# Patient Record
Sex: MALE | Race: WHITE | Employment: FULL TIME | URBAN - METROPOLITAN AREA
[De-identification: names, ages, dates, MRNs, and addresses within clinical notes are randomized per-mention and may not be internally consistent; named-entity substitution may affect disease eponyms.]

---

## 2017-01-13 ENCOUNTER — LAB ENCOUNTER (OUTPATIENT)
Dept: LAB | Age: 52
End: 2017-01-13
Attending: FAMILY MEDICINE
Payer: COMMERCIAL

## 2017-01-13 ENCOUNTER — HOSPITAL ENCOUNTER (OUTPATIENT)
Dept: GENERAL RADIOLOGY | Age: 52
Discharge: HOME | End: 2017-01-13
Attending: FAMILY MEDICINE
Payer: COMMERCIAL

## 2017-01-13 ENCOUNTER — OFFICE VISIT (OUTPATIENT)
Dept: FAMILY MEDICINE CLINIC | Facility: CLINIC | Age: 52
End: 2017-01-13

## 2017-01-13 VITALS
TEMPERATURE: 98 F | RESPIRATION RATE: 20 BRPM | HEIGHT: 68 IN | BODY MASS INDEX: 27.89 KG/M2 | HEART RATE: 66 BPM | WEIGHT: 184 LBS | SYSTOLIC BLOOD PRESSURE: 132 MMHG | DIASTOLIC BLOOD PRESSURE: 78 MMHG

## 2017-01-13 DIAGNOSIS — Z00.00 ADULT GENERAL MEDICAL EXAM: Primary | ICD-10-CM

## 2017-01-13 DIAGNOSIS — R09.82 PND (POST-NASAL DRIP): ICD-10-CM

## 2017-01-13 DIAGNOSIS — D23.30 FIBROUS PAPULE OF FACE: ICD-10-CM

## 2017-01-13 DIAGNOSIS — M79.642 PAIN IN BOTH HANDS: ICD-10-CM

## 2017-01-13 DIAGNOSIS — E78.2 MIXED HYPERLIPIDEMIA: ICD-10-CM

## 2017-01-13 DIAGNOSIS — K21.9 GASTROESOPHAGEAL REFLUX DISEASE, ESOPHAGITIS PRESENCE NOT SPECIFIED: ICD-10-CM

## 2017-01-13 DIAGNOSIS — M79.641 PAIN IN BOTH HANDS: ICD-10-CM

## 2017-01-13 DIAGNOSIS — Z00.00 ADULT GENERAL MEDICAL EXAM: ICD-10-CM

## 2017-01-13 DIAGNOSIS — R06.00 DYSPNEA, UNSPECIFIED TYPE: ICD-10-CM

## 2017-01-13 LAB
ALBUMIN SERPL BCP-MCNC: 4.2 G/DL (ref 3.5–4.8)
ALBUMIN/GLOB SERPL: 1.2 {RATIO} (ref 1–2)
ALP SERPL-CCNC: 58 U/L (ref 32–100)
ALT SERPL-CCNC: 36 U/L (ref 17–63)
ANION GAP SERPL CALC-SCNC: 8 MMOL/L (ref 0–18)
AST SERPL-CCNC: 28 U/L (ref 15–41)
BASOPHILS # BLD: 0 K/UL (ref 0–0.2)
BASOPHILS NFR BLD: 1 %
BILIRUB SERPL-MCNC: 1.1 MG/DL (ref 0.3–1.2)
BUN SERPL-MCNC: 17 MG/DL (ref 8–20)
BUN/CREAT SERPL: 17.3 (ref 10–20)
CALCIUM SERPL-MCNC: 9.2 MG/DL (ref 8.5–10.5)
CHLORIDE SERPL-SCNC: 106 MMOL/L (ref 95–110)
CHOLEST SERPL-MCNC: 159 MG/DL (ref 110–200)
CO2 SERPL-SCNC: 26 MMOL/L (ref 22–32)
CREAT SERPL-MCNC: 0.98 MG/DL (ref 0.5–1.5)
EOSINOPHIL # BLD: 0.1 K/UL (ref 0–0.7)
EOSINOPHIL NFR BLD: 3 %
ERYTHROCYTE [DISTWIDTH] IN BLOOD BY AUTOMATED COUNT: 13.6 % (ref 11–15)
GLOBULIN PLAS-MCNC: 3.5 G/DL (ref 2.5–3.7)
GLUCOSE SERPL-MCNC: 106 MG/DL (ref 70–99)
HCT VFR BLD AUTO: 47.9 % (ref 41–52)
HDLC SERPL-MCNC: 37 MG/DL
HGB BLD-MCNC: 16.3 G/DL (ref 13.5–17.5)
LDLC SERPL CALC-MCNC: 92 MG/DL (ref 0–99)
LYMPHOCYTES # BLD: 1.1 K/UL (ref 1–4)
LYMPHOCYTES NFR BLD: 24 %
MCH RBC QN AUTO: 29.3 PG (ref 27–32)
MCHC RBC AUTO-ENTMCNC: 34 G/DL (ref 32–37)
MCV RBC AUTO: 86.3 FL (ref 80–100)
MONOCYTES # BLD: 0.3 K/UL (ref 0–1)
MONOCYTES NFR BLD: 7 %
NEUTROPHILS # BLD AUTO: 3 K/UL (ref 1.8–7.7)
NEUTROPHILS NFR BLD: 65 %
NONHDLC SERPL-MCNC: 122 MG/DL
OSMOLALITY UR CALC.SUM OF ELEC: 292 MOSM/KG (ref 275–295)
PLATELET # BLD AUTO: 166 K/UL (ref 140–400)
PMV BLD AUTO: 8 FL (ref 7.4–10.3)
POTASSIUM SERPL-SCNC: 4 MMOL/L (ref 3.3–5.1)
PROT SERPL-MCNC: 7.7 G/DL (ref 5.9–8.4)
PSA SERPL-MCNC: 0.5 NG/ML (ref 0–4)
RBC # BLD AUTO: 5.55 M/UL (ref 4.5–5.9)
SODIUM SERPL-SCNC: 140 MMOL/L (ref 136–144)
TRIGL SERPL-MCNC: 152 MG/DL (ref 1–149)
TSH SERPL-ACNC: 1.8 UIU/ML (ref 0.34–5.6)
WBC # BLD AUTO: 4.5 K/UL (ref 4–11)

## 2017-01-13 PROCEDURE — 84443 ASSAY THYROID STIM HORMONE: CPT

## 2017-01-13 PROCEDURE — 80053 COMPREHEN METABOLIC PANEL: CPT

## 2017-01-13 PROCEDURE — 85025 COMPLETE CBC W/AUTO DIFF WBC: CPT

## 2017-01-13 PROCEDURE — 99396 PREV VISIT EST AGE 40-64: CPT | Performed by: FAMILY MEDICINE

## 2017-01-13 PROCEDURE — 80061 LIPID PANEL: CPT

## 2017-01-13 PROCEDURE — 99213 OFFICE O/P EST LOW 20 MIN: CPT | Performed by: FAMILY MEDICINE

## 2017-01-13 PROCEDURE — 36415 COLL VENOUS BLD VENIPUNCTURE: CPT

## 2017-01-13 PROCEDURE — 71020 XR CHEST PA + LAT CHEST (CPT=71020): CPT

## 2017-01-13 RX ORDER — PANTOPRAZOLE SODIUM 40 MG/1
TABLET, DELAYED RELEASE ORAL
Qty: 30 TABLET | Refills: 11 | Status: SHIPPED | OUTPATIENT
Start: 2017-01-13 | End: 2017-08-10

## 2017-01-13 RX ORDER — MONTELUKAST SODIUM 10 MG/1
10 TABLET ORAL DAILY
Qty: 30 TABLET | Refills: 3 | Status: SHIPPED | OUTPATIENT
Start: 2017-01-13 | End: 2017-07-12

## 2017-01-13 RX ORDER — FLUTICASONE PROPIONATE 50 MCG
2 SPRAY, SUSPENSION (ML) NASAL DAILY
Qty: 1 BOTTLE | Refills: 3 | Status: SHIPPED | OUTPATIENT
Start: 2017-01-13 | End: 2017-05-13

## 2017-01-13 NOTE — PROGRESS NOTES
Patient ID: Milena Gonzalez is a 45year old male. HPI  Patient presents with:  Routine Physical: annual    He states he always seems to have to clear his throat. He feels something is hanging from his throat in the back.   He also states he feels he c Nabumetone 750 MG Oral Tab Take 1 tablet (750 mg total) by mouth 2 (two) times daily. With meals. (for pain/inflammation).  Disp: 60 tablet Rfl: 1     Allergies:No Known Allergies   PHYSICAL EXAM:   Physical Exam   Constitutional: He is oriented to person, Psychiatric: He has a normal mood and affect. Vitals reviewed. Blood pressure 146/87, pulse 66, temperature 98.2 °F (36.8 °C), temperature source Oral, resp. rate 20, height 5' 8\" (1.727 m), weight 184 lb (83.462 kg).    01/13/17  1055 01/13/17  1123 Follow up if symptoms persist.  Take medicine (if given) as prescribed. Approach to treatment discussed and patient/family member understands and agrees to plan.          Aditya Brantley,   1/13/2017

## 2017-04-06 RX ORDER — ATORVASTATIN CALCIUM 20 MG/1
20 TABLET, FILM COATED ORAL NIGHTLY
Qty: 90 TABLET | Refills: 2 | Status: SHIPPED | OUTPATIENT
Start: 2017-04-06 | End: 2018-02-18

## 2017-04-06 NOTE — TELEPHONE ENCOUNTER
From: Angelina Pride  To:  Sukhi Arroyo DO  Sent: 4/3/2017 10:22 PM CDT  Subject: Medication Renewal Request    Original authorizing provider: DO Angelina Arreola would like a refill of the following medications:  Atorvastatin Calcium (

## 2017-04-06 NOTE — TELEPHONE ENCOUNTER
Passed protocol. Medication refilled.       Cholesterol Medications  Protocol Criteria:  · Appointment scheduled in the past 12 months or in the next 3 months  · ALT & LDL on file in the past 12 months  · ALT result < 80  · LDL result <130   Recent Visits

## 2017-08-10 ENCOUNTER — HOSPITAL ENCOUNTER (OUTPATIENT)
Dept: GENERAL RADIOLOGY | Age: 52
Discharge: HOME | End: 2017-08-10
Attending: FAMILY MEDICINE
Payer: COMMERCIAL

## 2017-08-10 ENCOUNTER — OFFICE VISIT (OUTPATIENT)
Dept: FAMILY MEDICINE CLINIC | Facility: CLINIC | Age: 52
End: 2017-08-10

## 2017-08-10 ENCOUNTER — APPOINTMENT (OUTPATIENT)
Dept: LAB | Age: 52
End: 2017-08-10
Attending: FAMILY MEDICINE
Payer: COMMERCIAL

## 2017-08-10 VITALS
DIASTOLIC BLOOD PRESSURE: 82 MMHG | SYSTOLIC BLOOD PRESSURE: 132 MMHG | HEART RATE: 68 BPM | RESPIRATION RATE: 12 BRPM | HEIGHT: 68 IN | BODY MASS INDEX: 28.25 KG/M2 | WEIGHT: 186.38 LBS | TEMPERATURE: 98 F

## 2017-08-10 DIAGNOSIS — M25.461 EFFUSION, RIGHT KNEE: ICD-10-CM

## 2017-08-10 DIAGNOSIS — G89.29 CHRONIC PAIN OF RIGHT KNEE: ICD-10-CM

## 2017-08-10 DIAGNOSIS — R73.9 HYPERGLYCEMIA: ICD-10-CM

## 2017-08-10 DIAGNOSIS — M25.561 CHRONIC PAIN OF RIGHT KNEE: ICD-10-CM

## 2017-08-10 DIAGNOSIS — E78.2 MIXED HYPERLIPIDEMIA: ICD-10-CM

## 2017-08-10 DIAGNOSIS — E78.2 MIXED HYPERLIPIDEMIA: Primary | ICD-10-CM

## 2017-08-10 DIAGNOSIS — K21.9 GASTROESOPHAGEAL REFLUX DISEASE, ESOPHAGITIS PRESENCE NOT SPECIFIED: ICD-10-CM

## 2017-08-10 LAB
ALT SERPL-CCNC: 31 U/L (ref 17–63)
AST SERPL-CCNC: 26 U/L (ref 15–41)
CHOLEST SERPL-MCNC: 152 MG/DL (ref 110–200)
GLUCOSE SERPL-MCNC: 100 MG/DL (ref 70–99)
HDLC SERPL-MCNC: 38 MG/DL
LDLC SERPL CALC-MCNC: 92 MG/DL (ref 0–99)
NONHDLC SERPL-MCNC: 114 MG/DL
TRIGL SERPL-MCNC: 111 MG/DL (ref 1–149)

## 2017-08-10 PROCEDURE — 84450 TRANSFERASE (AST) (SGOT): CPT

## 2017-08-10 PROCEDURE — 99212 OFFICE O/P EST SF 10 MIN: CPT | Performed by: FAMILY MEDICINE

## 2017-08-10 PROCEDURE — 73564 X-RAY EXAM KNEE 4 OR MORE: CPT | Performed by: FAMILY MEDICINE

## 2017-08-10 PROCEDURE — 99214 OFFICE O/P EST MOD 30 MIN: CPT | Performed by: FAMILY MEDICINE

## 2017-08-10 PROCEDURE — 36415 COLL VENOUS BLD VENIPUNCTURE: CPT

## 2017-08-10 PROCEDURE — 84460 ALANINE AMINO (ALT) (SGPT): CPT

## 2017-08-10 PROCEDURE — 80061 LIPID PANEL: CPT

## 2017-08-10 PROCEDURE — 82947 ASSAY GLUCOSE BLOOD QUANT: CPT

## 2017-08-10 RX ORDER — PANTOPRAZOLE SODIUM 40 MG/1
TABLET, DELAYED RELEASE ORAL
Qty: 30 TABLET | Refills: 11 | Status: SHIPPED | OUTPATIENT
Start: 2017-08-10 | End: 2018-02-18

## 2017-08-10 RX ORDER — MELOXICAM 15 MG/1
15 TABLET ORAL DAILY
Qty: 30 TABLET | Refills: 1 | Status: SHIPPED | OUTPATIENT
Start: 2017-08-10 | End: 2017-09-09

## 2017-08-10 NOTE — PROGRESS NOTES
Patient ID: Tung Mathis is a 47year old male. HPI  Patient presents with:  Hyperlipidemia  Pain: right knee    He has been taking his atorvastatin daily. He states he feels well with it. He has no chest pain or shortness of breath.   He works as ANIONGAP 8 01/13/2017   GFRAA >60 01/13/2017   GFRNAA >60 01/13/2017   CA 9.2 01/13/2017    01/13/2017   K 4.0 01/13/2017    01/13/2017   CO2 26 01/13/2017   OSMOCALC 292 01/13/2017         Lab Results  Component Value Date   COLORUR Yellow 1 : 132/78  07/28/16 : 132/81  03/31/16 : 135/78  11/20/14 : 127/83  09/04/14 : 129/78        Review of Systems      Past Medical History:   Diagnosis Date   • Hypercholesterolemia        Past Surgical History:  10/10/13: ELECTROCARDIOGRAM, COMPLETE      Com Negative      Pivot Shift Negative              Varus Stress       0 Degrees Negative      30 Degrees Negative       Valgus Stress        0 Degrees Negative      30 Degrees Negative       Patellar apprehension Negative   Patellofemoral pain Positive

## 2018-02-18 DIAGNOSIS — K21.9 GASTROESOPHAGEAL REFLUX DISEASE, ESOPHAGITIS PRESENCE NOT SPECIFIED: ICD-10-CM

## 2018-02-19 RX ORDER — ATORVASTATIN CALCIUM 20 MG/1
20 TABLET, FILM COATED ORAL NIGHTLY
Qty: 90 TABLET | Refills: 0 | Status: SHIPPED
Start: 2018-02-19 | End: 2018-03-02

## 2018-02-19 RX ORDER — PANTOPRAZOLE SODIUM 40 MG/1
TABLET, DELAYED RELEASE ORAL
Qty: 90 TABLET | Refills: 0 | Status: SHIPPED
Start: 2018-02-19 | End: 2018-03-02

## 2018-02-19 NOTE — TELEPHONE ENCOUNTER
From: Charlene Tabares  Sent: 2/18/2018 6:37 AM CST  Subject: Medication Renewal Request    Charlene Tabares would like a refill of the following medications:     Atorvastatin Calcium (LIPITOR) 20 MG Oral Tab Inna Youssef DO]   Patient Comment: [facility]

## 2018-02-19 NOTE — TELEPHONE ENCOUNTER
Cholesterol Medications; REFILLED PER PROTOCOL.     Protocol Criteria:  · Appointment scheduled in the past 12 months or in the next 3 months  · ALT & LDL on file in the past 12 months  · ALT result < 80  · LDL result <130   Recent Outpatient Visits Jessi Roth, DO [address], [facility] [address], [place] physical

## 2018-03-02 ENCOUNTER — OFFICE VISIT (OUTPATIENT)
Dept: FAMILY MEDICINE CLINIC | Facility: CLINIC | Age: 53
End: 2018-03-02

## 2018-03-02 ENCOUNTER — LAB ENCOUNTER (OUTPATIENT)
Dept: LAB | Age: 53
End: 2018-03-02
Attending: FAMILY MEDICINE
Payer: COMMERCIAL

## 2018-03-02 VITALS
HEIGHT: 68 IN | TEMPERATURE: 98 F | WEIGHT: 190 LBS | HEART RATE: 71 BPM | BODY MASS INDEX: 28.79 KG/M2 | SYSTOLIC BLOOD PRESSURE: 123 MMHG | DIASTOLIC BLOOD PRESSURE: 75 MMHG

## 2018-03-02 DIAGNOSIS — E78.2 MIXED HYPERLIPIDEMIA: ICD-10-CM

## 2018-03-02 DIAGNOSIS — Z00.00 ADULT GENERAL MEDICAL EXAM: Primary | ICD-10-CM

## 2018-03-02 DIAGNOSIS — S39.011A STRAIN OF ABDOMINAL MUSCLE, INITIAL ENCOUNTER: ICD-10-CM

## 2018-03-02 DIAGNOSIS — Z00.00 ADULT GENERAL MEDICAL EXAM: ICD-10-CM

## 2018-03-02 DIAGNOSIS — K21.9 GASTROESOPHAGEAL REFLUX DISEASE, ESOPHAGITIS PRESENCE NOT SPECIFIED: ICD-10-CM

## 2018-03-02 DIAGNOSIS — M25.60 MULTIPLE STIFF JOINTS: ICD-10-CM

## 2018-03-02 DIAGNOSIS — M25.50 ARTHRALGIA, UNSPECIFIED JOINT: ICD-10-CM

## 2018-03-02 LAB
ALBUMIN SERPL BCP-MCNC: 4.1 G/DL (ref 3.5–4.8)
ALBUMIN/GLOB SERPL: 1.3 {RATIO} (ref 1–2)
ALP SERPL-CCNC: 50 U/L (ref 32–100)
ALT SERPL-CCNC: 32 U/L (ref 17–63)
ANION GAP SERPL CALC-SCNC: 10 MMOL/L (ref 0–18)
AST SERPL-CCNC: 26 U/L (ref 15–41)
BASOPHILS # BLD: 0.1 K/UL (ref 0–0.2)
BASOPHILS NFR BLD: 1 %
BILIRUB SERPL-MCNC: 1.8 MG/DL (ref 0.3–1.2)
BUN SERPL-MCNC: 13 MG/DL (ref 8–20)
BUN/CREAT SERPL: 12.9 (ref 10–20)
CALCIUM SERPL-MCNC: 9.1 MG/DL (ref 8.5–10.5)
CHLORIDE SERPL-SCNC: 101 MMOL/L (ref 95–110)
CHOLEST SERPL-MCNC: 138 MG/DL (ref 110–200)
CO2 SERPL-SCNC: 25 MMOL/L (ref 22–32)
CREAT SERPL-MCNC: 1.01 MG/DL (ref 0.5–1.5)
CRP SERPL-MCNC: <0.5 MG/DL (ref 0–0.9)
EOSINOPHIL # BLD: 0.2 K/UL (ref 0–0.7)
EOSINOPHIL NFR BLD: 4 %
ERYTHROCYTE [DISTWIDTH] IN BLOOD BY AUTOMATED COUNT: 13.4 % (ref 11–15)
ERYTHROCYTE [SEDIMENTATION RATE] IN BLOOD: 10 MM/HR (ref 0–20)
GLOBULIN PLAS-MCNC: 3.1 G/DL (ref 2.5–3.7)
GLUCOSE SERPL-MCNC: 105 MG/DL (ref 70–99)
HCT VFR BLD AUTO: 47.3 % (ref 41–52)
HDLC SERPL-MCNC: 35 MG/DL
HGB BLD-MCNC: 16 G/DL (ref 13.5–17.5)
LDLC SERPL CALC-MCNC: 74 MG/DL (ref 0–99)
LYMPHOCYTES # BLD: 1 K/UL (ref 1–4)
LYMPHOCYTES NFR BLD: 21 %
MCH RBC QN AUTO: 28.8 PG (ref 27–32)
MCHC RBC AUTO-ENTMCNC: 33.8 G/DL (ref 32–37)
MCV RBC AUTO: 85.3 FL (ref 80–100)
MONOCYTES # BLD: 0.4 K/UL (ref 0–1)
MONOCYTES NFR BLD: 8 %
NEUTROPHILS # BLD AUTO: 3.2 K/UL (ref 1.8–7.7)
NEUTROPHILS NFR BLD: 66 %
NONHDLC SERPL-MCNC: 103 MG/DL
OSMOLALITY UR CALC.SUM OF ELEC: 282 MOSM/KG (ref 275–295)
PATIENT FASTING: YES
PLATELET # BLD AUTO: 165 K/UL (ref 140–400)
PMV BLD AUTO: 7.8 FL (ref 7.4–10.3)
POTASSIUM SERPL-SCNC: 3.9 MMOL/L (ref 3.3–5.1)
PROT SERPL-MCNC: 7.2 G/DL (ref 5.9–8.4)
PSA SERPL-MCNC: 0.5 NG/ML (ref 0–4)
RBC # BLD AUTO: 5.54 M/UL (ref 4.5–5.9)
RHEUMATOID FACT SER QL: <5 IU/ML
SODIUM SERPL-SCNC: 136 MMOL/L (ref 136–144)
TRIGL SERPL-MCNC: 147 MG/DL (ref 1–149)
TSH SERPL-ACNC: 2.75 UIU/ML (ref 0.45–5.33)
WBC # BLD AUTO: 4.9 K/UL (ref 4–11)

## 2018-03-02 PROCEDURE — 86431 RHEUMATOID FACTOR QUANT: CPT

## 2018-03-02 PROCEDURE — 85652 RBC SED RATE AUTOMATED: CPT

## 2018-03-02 PROCEDURE — 80061 LIPID PANEL: CPT

## 2018-03-02 PROCEDURE — 86140 C-REACTIVE PROTEIN: CPT

## 2018-03-02 PROCEDURE — 80053 COMPREHEN METABOLIC PANEL: CPT

## 2018-03-02 PROCEDURE — 36415 COLL VENOUS BLD VENIPUNCTURE: CPT

## 2018-03-02 PROCEDURE — 82306 VITAMIN D 25 HYDROXY: CPT

## 2018-03-02 PROCEDURE — 85025 COMPLETE CBC W/AUTO DIFF WBC: CPT

## 2018-03-02 PROCEDURE — 84443 ASSAY THYROID STIM HORMONE: CPT

## 2018-03-02 PROCEDURE — 99396 PREV VISIT EST AGE 40-64: CPT | Performed by: FAMILY MEDICINE

## 2018-03-02 PROCEDURE — 99212 OFFICE O/P EST SF 10 MIN: CPT | Performed by: FAMILY MEDICINE

## 2018-03-02 PROCEDURE — 86038 ANTINUCLEAR ANTIBODIES: CPT

## 2018-03-02 PROCEDURE — 99213 OFFICE O/P EST LOW 20 MIN: CPT | Performed by: FAMILY MEDICINE

## 2018-03-02 RX ORDER — ATORVASTATIN CALCIUM 20 MG/1
20 TABLET, FILM COATED ORAL NIGHTLY
Qty: 90 TABLET | Refills: 3 | Status: SHIPPED | OUTPATIENT
Start: 2018-03-02 | End: 2018-09-23

## 2018-03-02 RX ORDER — PANTOPRAZOLE SODIUM 40 MG/1
TABLET, DELAYED RELEASE ORAL
Qty: 90 TABLET | Refills: 3 | Status: SHIPPED | OUTPATIENT
Start: 2018-03-02 | End: 2018-08-07

## 2018-03-02 NOTE — PROGRESS NOTES
Patient ID: Gibran Das is a 47year old male. HPI  Patient presents with:  Physical    He is here for physical.  His colonoscopy is up-to-date. He does not urinate at night. No tobacco now for 1 year. He works at the hospital in maintenance. 292 01/13/2017   ALKPHO 58 01/13/2017   AST 26 08/10/2017   ALT 31 08/10/2017   ALKPHOS 59 04/02/2016   BILT 1.1 01/13/2017   TP 7.7 01/13/2017   ALB 4.2 01/13/2017   GLOBULIN 3.5 01/13/2017   AGRATIO 1.0 04/02/2016    01/13/2017   K 4.0 01/13/2017 [facility]    Lab Results  Component Value Date   PSA 0.5 01/13/2017   TOTPSASCREEN 0.5 04/02/2016         Wt Readings from Last 6 Encounters:  03/02/18 : 190 lb (86.2 kg)  08/10/17 : 186 lb 6.4 oz (84.6 kg)  01/13/17 : 184 lb (83.5 kg)  07/28/16 : 184 lb (8 file     Other Topics Concern    Caffeine Concern Yes    Comment: 4 cups coffee/day     Social History Narrative   None on file              Current Outpatient Prescriptions:  atorvastatin (LIPITOR) 20 MG Oral Tab Take 1 tablet (20 mg total) by mouth night weight 190 lb (86.2 kg). ASSESSMENT/PLAN:     Diagnoses and all orders for this visit:    Adult general medical exam  -     CBC WITH DIFFERENTIAL WITH PLATELET; Future  -     COMP METABOLIC PANEL (14); Future  -     LIPID PANEL;  Future  -     PSA S

## 2018-03-05 LAB
25(OH)D3 SERPL-MCNC: 17.6 NG/ML
NUCLEAR IGG TITR SER IF: NEGATIVE {TITER}

## 2018-03-10 ENCOUNTER — PATIENT MESSAGE (OUTPATIENT)
Dept: FAMILY MEDICINE CLINIC | Facility: CLINIC | Age: 53
End: 2018-03-10

## 2018-03-12 RX ORDER — ERGOCALCIFEROL 1.25 MG/1
50000 CAPSULE ORAL WEEKLY
Qty: 12 CAPSULE | Refills: 0 | Status: SHIPPED | OUTPATIENT
Start: 2018-03-12 | End: 2019-04-11

## 2018-03-12 NOTE — TELEPHONE ENCOUNTER
From: Ambar Smith  To:  Martha Lacy DO  Sent: 3/10/2018 6:17 PM CST  Subject: Prescription Question    In Dr. Rubalcava Mode comments about my last test results, he said that he was going to put me on vitamin D for the next 3 months (because I had gone lo

## 2018-05-25 RX ORDER — ATORVASTATIN CALCIUM 20 MG/1
20 TABLET, FILM COATED ORAL NIGHTLY
Qty: 90 TABLET | Refills: 3 | Status: CANCELLED | OUTPATIENT
Start: 2018-05-25

## 2018-05-25 NOTE — TELEPHONE ENCOUNTER
From: Annie Pereira  Sent: 5/25/2018 12:06 PM CDT  Subject: Medication Renewal Request    Annie Pereira would like a refill of the following medications:     atorvastatin (LIPITOR) 20 MG Oral Tab Fay Nguyen, ]   Patient Comment: I almost run out

## 2018-05-29 NOTE — TELEPHONE ENCOUNTER
Disp Refills Start End    atorvastatin (LIPITOR) 20 MG Oral Tab 90 tablet 3 3/2/2018     Sig - Route: Take 1 tablet (20 mg total) by mouth nightly.  - Oral    E-Prescribing Status: Receipt confirmed by pharmacy (3/2/2018  8:33 AM CST      Patient notified

## 2018-08-07 DIAGNOSIS — K21.9 GASTROESOPHAGEAL REFLUX DISEASE, ESOPHAGITIS PRESENCE NOT SPECIFIED: ICD-10-CM

## 2018-08-08 RX ORDER — PANTOPRAZOLE SODIUM 40 MG/1
TABLET, DELAYED RELEASE ORAL
Qty: 90 TABLET | Refills: 0 | Status: SHIPPED | OUTPATIENT
Start: 2018-08-08 | End: 2018-09-13

## 2018-08-08 NOTE — TELEPHONE ENCOUNTER
Refill Protocol Appointment Criteria  · Appointment scheduled in the past 12 months or in the next 3 months  Recent Outpatient Visits            5 months ago Adult general medical exam    [address], [place],     Office V

## 2018-08-08 NOTE — TELEPHONE ENCOUNTER
From: Reggie Newman  Sent: 8/7/2018 10:01 PM CDT  Subject: Medication Renewal Request    Reggie Newman would like a refill of the following medications:     Pantoprazole Sodium 40 MG Oral Tab EC Tramaine Gagnon DO]    [facility]

## 2018-09-13 ENCOUNTER — HOSPITAL ENCOUNTER (OUTPATIENT)
Dept: GENERAL RADIOLOGY | Age: 53
Discharge: HOME | End: 2018-09-13
Attending: FAMILY MEDICINE
Payer: COMMERCIAL

## 2018-09-13 ENCOUNTER — APPOINTMENT (OUTPATIENT)
Dept: LAB | Age: 53
End: 2018-09-13
Attending: FAMILY MEDICINE
Payer: COMMERCIAL

## 2018-09-13 ENCOUNTER — OFFICE VISIT (OUTPATIENT)
Dept: FAMILY MEDICINE CLINIC | Facility: CLINIC | Age: 53
End: 2018-09-13
Payer: COMMERCIAL

## 2018-09-13 VITALS
HEART RATE: 85 BPM | SYSTOLIC BLOOD PRESSURE: 139 MMHG | HEIGHT: 68 IN | BODY MASS INDEX: 28.04 KG/M2 | WEIGHT: 185 LBS | DIASTOLIC BLOOD PRESSURE: 83 MMHG | TEMPERATURE: 98 F

## 2018-09-13 DIAGNOSIS — M79.641 PAIN IN BOTH HANDS: ICD-10-CM

## 2018-09-13 DIAGNOSIS — M25.50 ARTHRALGIA, UNSPECIFIED JOINT: ICD-10-CM

## 2018-09-13 DIAGNOSIS — M25.60 MULTIPLE STIFF JOINTS: ICD-10-CM

## 2018-09-13 DIAGNOSIS — K21.9 GASTROESOPHAGEAL REFLUX DISEASE, ESOPHAGITIS PRESENCE NOT SPECIFIED: ICD-10-CM

## 2018-09-13 DIAGNOSIS — M79.642 PAIN IN BOTH HANDS: ICD-10-CM

## 2018-09-13 DIAGNOSIS — E55.9 VITAMIN D DEFICIENCY: ICD-10-CM

## 2018-09-13 DIAGNOSIS — E78.2 MIXED HYPERLIPIDEMIA: ICD-10-CM

## 2018-09-13 DIAGNOSIS — E78.2 MIXED HYPERLIPIDEMIA: Primary | ICD-10-CM

## 2018-09-13 LAB
ALT SERPL-CCNC: 31 U/L (ref 17–63)
ANION GAP SERPL CALC-SCNC: 8 MMOL/L (ref 0–18)
AST SERPL-CCNC: 28 U/L (ref 15–41)
BUN SERPL-MCNC: 12 MG/DL (ref 8–20)
BUN/CREAT SERPL: 12.6 (ref 10–20)
CALCIUM SERPL-MCNC: 9.1 MG/DL (ref 8.5–10.5)
CHLORIDE SERPL-SCNC: 107 MMOL/L (ref 95–110)
CO2 SERPL-SCNC: 24 MMOL/L (ref 22–32)
CREAT SERPL-MCNC: 0.95 MG/DL (ref 0.5–1.5)
GLUCOSE SERPL-MCNC: 91 MG/DL (ref 70–99)
OSMOLALITY UR CALC.SUM OF ELEC: 287 MOSM/KG (ref 275–295)
POTASSIUM SERPL-SCNC: 3.6 MMOL/L (ref 3.3–5.1)
SODIUM SERPL-SCNC: 139 MMOL/L (ref 136–144)
VIT B12 SERPL-MCNC: 433 PG/ML (ref 181–914)

## 2018-09-13 PROCEDURE — 99212 OFFICE O/P EST SF 10 MIN: CPT | Performed by: FAMILY MEDICINE

## 2018-09-13 PROCEDURE — 80048 BASIC METABOLIC PNL TOTAL CA: CPT

## 2018-09-13 PROCEDURE — 84460 ALANINE AMINO (ALT) (SGPT): CPT

## 2018-09-13 PROCEDURE — 82607 VITAMIN B-12: CPT

## 2018-09-13 PROCEDURE — 82248 BILIRUBIN DIRECT: CPT | Performed by: FAMILY MEDICINE

## 2018-09-13 PROCEDURE — 73130 X-RAY EXAM OF HAND: CPT | Performed by: FAMILY MEDICINE

## 2018-09-13 PROCEDURE — 82306 VITAMIN D 25 HYDROXY: CPT

## 2018-09-13 PROCEDURE — 72050 X-RAY EXAM NECK SPINE 4/5VWS: CPT | Performed by: FAMILY MEDICINE

## 2018-09-13 PROCEDURE — 99214 OFFICE O/P EST MOD 30 MIN: CPT | Performed by: FAMILY MEDICINE

## 2018-09-13 PROCEDURE — 36415 COLL VENOUS BLD VENIPUNCTURE: CPT

## 2018-09-13 PROCEDURE — 84450 TRANSFERASE (AST) (SGOT): CPT

## 2018-09-13 RX ORDER — PANTOPRAZOLE SODIUM 40 MG/1
TABLET, DELAYED RELEASE ORAL
Qty: 90 TABLET | Refills: 1 | Status: SHIPPED | OUTPATIENT
Start: 2018-09-13 | End: 2019-03-01

## 2018-09-13 RX ORDER — NABUMETONE 750 MG/1
750 TABLET, FILM COATED ORAL 2 TIMES DAILY
Qty: 60 TABLET | Refills: 1 | Status: SHIPPED | OUTPATIENT
Start: 2018-09-13 | End: 2018-11-03

## 2018-09-13 NOTE — PROGRESS NOTES
Patient ID: Vickie Karimi is a 46year old male. HPI  Patient presents with:  Hyperlipidemia  Joint Pain      He states when he wakes up his joints are a bit stiff.   This started about 2 months ago but I remember we did quite a bit of labs on him in CREATSERUM 1.01 03/02/2018    ANIONGAP 10 03/02/2018    GFRNAA >60 03/02/2018    GFRAA >60 03/02/2018    CA 9.1 03/02/2018    OSMOCALC 282 03/02/2018    ALKPHO 50 03/02/2018    AST 26 03/02/2018    ALT 32 03/02/2018    ALKPHOS 59 04/02/2016    BILT 1.8 (H) found for: IRON, IRONTOT, TIBC, IRONSAT, TRANSFERRIN, TIBCP, IRONBIND, SAT, SATUR    No results found for: SPENCER    Lab Results   Component Value Date    EQYX71SO 17.6 03/02/2018     Lab Results   Component Value Date    PSA 0.5 03/02/2018    TOTPSASCREEN 0. Normal rate, regular rhythm and normal heart sounds. Pulmonary/Chest: Effort normal and breath sounds normal. No respiratory distress. Abdominal: Normal appearance and bowel sounds are normal. There is    no tenderness.   There is no rigidity, no rebou XR HAND BILAT (CPT=73130); Future  -     XR CERVICAL SPINE (4VIEWS) (CPT=72050); Future    Vitamin D deficiency  -     VITAMIN D, 25-HYDROXY; Future  History of vitamin D deficiency in the past.  We will recheck the vitamin D as well today.   Currently he i

## 2018-09-14 LAB — 25(OH)D3 SERPL-MCNC: 18.8 NG/ML

## 2018-09-17 PROBLEM — E55.9 VITAMIN D DEFICIENCY: Status: ACTIVE | Noted: 2018-09-17

## 2018-09-18 ENCOUNTER — TELEPHONE (OUTPATIENT)
Dept: FAMILY MEDICINE CLINIC | Facility: CLINIC | Age: 53
End: 2018-09-18

## 2018-09-18 NOTE — TELEPHONE ENCOUNTER
----- Message from Crystal Jimenez DO sent at 9/17/2018 11:25 PM CDT -----  Vitamin D is on the low side. I think you should start daily vitamin D 2000 international units. I will send this in for a year.   If your insurance starts refusing to cover you ca

## 2018-09-24 RX ORDER — ATORVASTATIN CALCIUM 20 MG/1
20 TABLET, FILM COATED ORAL NIGHTLY
Qty: 90 TABLET | Refills: 0 | Status: SHIPPED | OUTPATIENT
Start: 2018-09-24 | End: 2018-12-25

## 2018-09-25 NOTE — TELEPHONE ENCOUNTER
Cholesterol Medications  Protocol Criteria:  · Appointment scheduled in the past 12 months or in the next 3 months  · ALT & LDL on file in the past 12 months  · ALT result < 80  · LDL result <130   Recent Outpatient Visits            1 week ago Mixed hyper

## 2018-11-03 DIAGNOSIS — M79.641 PAIN IN BOTH HANDS: ICD-10-CM

## 2018-11-03 DIAGNOSIS — M79.642 PAIN IN BOTH HANDS: ICD-10-CM

## 2018-11-03 DIAGNOSIS — M25.50 ARTHRALGIA, UNSPECIFIED JOINT: ICD-10-CM

## 2018-11-03 DIAGNOSIS — M25.60 MULTIPLE STIFF JOINTS: ICD-10-CM

## 2018-11-04 NOTE — TELEPHONE ENCOUNTER
Please advise in regards to refill request. Thank You    Refill Protocol Appointment Criteria  · Appointment scheduled in the past 6 months or in the next 3 months  Recent Outpatient Visits            1 month ago Mixed hyperlipidemia    [facility]

## 2018-11-06 RX ORDER — NABUMETONE 750 MG/1
750 TABLET, FILM COATED ORAL 2 TIMES DAILY
Qty: 60 TABLET | Refills: 1 | Status: SHIPPED | OUTPATIENT
Start: 2018-11-06 | End: 2019-04-11

## 2018-12-26 RX ORDER — ATORVASTATIN CALCIUM 20 MG/1
TABLET, FILM COATED ORAL
Qty: 90 TABLET | Refills: 0 | Status: SHIPPED | OUTPATIENT
Start: 2018-12-26 | End: 2019-04-13

## 2019-03-01 DIAGNOSIS — K21.9 GASTROESOPHAGEAL REFLUX DISEASE, ESOPHAGITIS PRESENCE NOT SPECIFIED: ICD-10-CM

## 2019-03-01 RX ORDER — PANTOPRAZOLE SODIUM 40 MG/1
TABLET, DELAYED RELEASE ORAL
Qty: 90 TABLET | Refills: 0 | Status: SHIPPED | OUTPATIENT
Start: 2019-03-01 | End: 2019-07-21

## 2019-04-11 ENCOUNTER — OFFICE VISIT (OUTPATIENT)
Dept: FAMILY MEDICINE CLINIC | Facility: CLINIC | Age: 54
End: 2019-04-11
Payer: COMMERCIAL

## 2019-04-11 VITALS
BODY MASS INDEX: 28.49 KG/M2 | WEIGHT: 188 LBS | DIASTOLIC BLOOD PRESSURE: 85 MMHG | RESPIRATION RATE: 12 BRPM | HEART RATE: 74 BPM | TEMPERATURE: 97 F | SYSTOLIC BLOOD PRESSURE: 134 MMHG | HEIGHT: 68 IN

## 2019-04-11 DIAGNOSIS — K21.9 GASTROESOPHAGEAL REFLUX DISEASE, ESOPHAGITIS PRESENCE NOT SPECIFIED: ICD-10-CM

## 2019-04-11 DIAGNOSIS — M79.605 LEFT LEG PAIN: ICD-10-CM

## 2019-04-11 DIAGNOSIS — Z00.00 ADULT GENERAL MEDICAL EXAM: Primary | ICD-10-CM

## 2019-04-11 DIAGNOSIS — E78.2 MIXED HYPERLIPIDEMIA: ICD-10-CM

## 2019-04-11 DIAGNOSIS — E55.9 VITAMIN D DEFICIENCY: ICD-10-CM

## 2019-04-11 PROCEDURE — 99396 PREV VISIT EST AGE 40-64: CPT | Performed by: FAMILY MEDICINE

## 2019-04-11 NOTE — PROGRESS NOTES
Patient ID: Valencia Gitelman is a 46year old male. HPI  Patient presents with:  Routine Physical  , does not smoke, works at the hospital in maintenance. Colonoscopy up to date.       Does not need to take PPI daily for GERD but does take it m 09/13/2018       Lab Results   Component Value Date    GLU 91 09/13/2018    BUN 12 09/13/2018    CREATSERUM 0.95 09/13/2018    BUNCREA 12.6 09/13/2018    ANIONGAP 8 09/13/2018    GFRAA >60 09/13/2018    GFRNAA >60 09/13/2018    CA 9.1 09/13/2018     (83.9 kg)  03/02/18 : 190 lb (86.2 kg)  08/10/17 : 186 lb 6.4 oz (84.6 kg)  01/13/17 : 184 lb (83.5 kg)  07/28/16 : 184 lb (83.5 kg)              BMI Readings from Last 6 Encounters:  04/11/19 : 28.59 kg/m²  09/13/18 : 28.13 kg/m²  03/02/18 : 28.89 kg/m² Used    Substance and Sexual Activity      Alcohol use: No      Drug use: No      Sexual activity: Not on file    Lifestyle      Physical activity:        Days per week: Not on file        Minutes per session: Not on file      Stress: Not on file    Relati membranes are normal.   Eyes: Conjunctivae and EOM are normal. Pupils are equal, round, and reactive to light. Neck: Normal range of motion. Neck supple. No thyromegaly present. Cardiovascular: Normal rate, regular rhythm and no  murmur heard.   Pulmona area out and massage it. Alicia Ivory  4/11/2019      . By signing my name below, I, Alicia Ivory,  attest that this documentation has been prepared under the direction and in the presence of David Potter DO.    Electronically Signed: Georgia Vick

## 2019-04-13 ENCOUNTER — LAB ENCOUNTER (OUTPATIENT)
Dept: LAB | Facility: HOSPITAL | Age: 54
End: 2019-04-13
Attending: FAMILY MEDICINE
Payer: COMMERCIAL

## 2019-04-13 DIAGNOSIS — Z00.00 ADULT GENERAL MEDICAL EXAM: ICD-10-CM

## 2019-04-13 DIAGNOSIS — E78.2 MIXED HYPERLIPIDEMIA: ICD-10-CM

## 2019-04-13 DIAGNOSIS — E55.9 VITAMIN D DEFICIENCY: ICD-10-CM

## 2019-04-13 PROCEDURE — 82306 VITAMIN D 25 HYDROXY: CPT

## 2019-04-13 PROCEDURE — 80053 COMPREHEN METABOLIC PANEL: CPT

## 2019-04-13 PROCEDURE — 36415 COLL VENOUS BLD VENIPUNCTURE: CPT

## 2019-04-13 PROCEDURE — 80061 LIPID PANEL: CPT

## 2019-04-13 PROCEDURE — 85025 COMPLETE CBC W/AUTO DIFF WBC: CPT

## 2019-04-13 PROCEDURE — 84443 ASSAY THYROID STIM HORMONE: CPT

## 2019-05-08 ENCOUNTER — OFFICE VISIT (OUTPATIENT)
Dept: INTERNAL MEDICINE CLINIC | Facility: HOSPITAL | Age: 54
End: 2019-05-08
Attending: EMERGENCY MEDICINE

## 2019-05-08 DIAGNOSIS — R76.11 POSITIVE TB TEST: Primary | ICD-10-CM

## 2019-05-08 PROCEDURE — 86480 TB TEST CELL IMMUN MEASURE: CPT

## 2019-07-21 DIAGNOSIS — K21.9 GASTROESOPHAGEAL REFLUX DISEASE, ESOPHAGITIS PRESENCE NOT SPECIFIED: ICD-10-CM

## 2019-07-22 RX ORDER — PANTOPRAZOLE SODIUM 40 MG/1
TABLET, DELAYED RELEASE ORAL
Qty: 90 TABLET | Refills: 1 | Status: SHIPPED | OUTPATIENT
Start: 2019-07-22 | End: 2020-06-08

## 2019-12-05 ENCOUNTER — OFFICE VISIT (OUTPATIENT)
Dept: FAMILY MEDICINE CLINIC | Facility: CLINIC | Age: 54
End: 2019-12-05
Payer: COMMERCIAL

## 2019-12-05 ENCOUNTER — HOSPITAL ENCOUNTER (OUTPATIENT)
Dept: GENERAL RADIOLOGY | Age: 54
Discharge: HOME | End: 2019-12-05
Attending: FAMILY MEDICINE
Payer: COMMERCIAL

## 2019-12-05 VITALS
WEIGHT: 188 LBS | HEIGHT: 68 IN | BODY MASS INDEX: 28.49 KG/M2 | DIASTOLIC BLOOD PRESSURE: 85 MMHG | SYSTOLIC BLOOD PRESSURE: 137 MMHG | TEMPERATURE: 98 F | HEART RATE: 76 BPM

## 2019-12-05 DIAGNOSIS — R06.00 DYSPNEA, UNSPECIFIED TYPE: ICD-10-CM

## 2019-12-05 DIAGNOSIS — S29.012A STRAIN OF RHOMBOID MUSCLE, INITIAL ENCOUNTER: ICD-10-CM

## 2019-12-05 DIAGNOSIS — R06.00 DYSPNEA, UNSPECIFIED TYPE: Primary | ICD-10-CM

## 2019-12-05 DIAGNOSIS — E78.2 MIXED HYPERLIPIDEMIA: ICD-10-CM

## 2019-12-05 DIAGNOSIS — M54.6 CHRONIC BILATERAL THORACIC BACK PAIN: ICD-10-CM

## 2019-12-05 DIAGNOSIS — G89.29 CHRONIC BILATERAL THORACIC BACK PAIN: ICD-10-CM

## 2019-12-05 PROCEDURE — 99214 OFFICE O/P EST MOD 30 MIN: CPT | Performed by: FAMILY MEDICINE

## 2019-12-05 PROCEDURE — 71046 X-RAY EXAM CHEST 2 VIEWS: CPT | Performed by: FAMILY MEDICINE

## 2019-12-05 RX ORDER — CYCLOBENZAPRINE HCL 10 MG
10 TABLET ORAL NIGHTLY
Qty: 30 TABLET | Refills: 0 | Status: SHIPPED | OUTPATIENT
Start: 2019-12-05 | End: 2020-01-04

## 2019-12-05 RX ORDER — MELOXICAM 15 MG/1
15 TABLET ORAL DAILY
Qty: 30 TABLET | Refills: 0 | Status: SHIPPED | OUTPATIENT
Start: 2019-12-05 | End: 2020-01-04

## 2019-12-05 RX ORDER — ATORVASTATIN CALCIUM 20 MG/1
TABLET, FILM COATED ORAL
Qty: 90 TABLET | Refills: 2 | Status: SHIPPED | OUTPATIENT
Start: 2019-12-05 | End: 2020-10-09

## 2019-12-05 NOTE — PROGRESS NOTES
Patient ID: Tung Mathis is a 53year old male. HPI  Patient presents with:  Hyperlipidemia: follow up  Back Pain    Last seen by me on 4/11/19. 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 ANIONGAP 6 04/13/2019    GFRNAA 89 04/13/2019    GFRAA 103 04/13/2019    CA 8.9 04/13/2019    OSMOCALC 291 04/13/2019    ALKPHO 70 04/13/2019    AST 25 04/13/2019    ALT 39 04/13/2019    ALKPHOS 59 04/02/2016    BILT 0.9 04/13/2019    TP 7.9 04/13/2019 Encounters:  12/05/19 : 188 lb (85.3 kg)  04/11/19 : 188 lb (85.3 kg)  09/13/18 : 185 lb (83.9 kg)  03/02/18 : 190 lb (86.2 kg)  08/10/17 : 186 lb 6.4 oz (84.6 kg)  01/13/17 : 184 lb (83.5 kg)      BMI Readings from Last 6 Encounters:  12/05/19 : 28.59 kg/ Normal rate, regular rhythm and normal heart sounds. Pulmonary/Chest: Effort normal and breath sounds normal. No respiratory distress. Neurological: Patient is alert and oriented to person, place, and time. Normal upper and lower extremity DTR.  Normal PHYSICAL THERAPY - INTERNAL    Mixed hyperlipidemia  -     atorvastatin 20 MG Oral Tab; TAKE 1 TABLET BY MOUTH EVERY EVENING        Referrals (if applicable)  Orders Placed This Encounter      Physical Therapy ([facility]) - [facility]          Order C

## 2019-12-05 NOTE — PATIENT INSTRUCTIONS
Go ahead and start physical therapy. Work on rhomboid strengthening exercises and scapular stabilizing exercises. You can even look this up on the Internet.   I think therapy though would be worthwhile as they will help with strengthening the muscles and

## 2020-06-08 DIAGNOSIS — K21.9 GASTROESOPHAGEAL REFLUX DISEASE, ESOPHAGITIS PRESENCE NOT SPECIFIED: ICD-10-CM

## 2020-06-08 RX ORDER — PANTOPRAZOLE SODIUM 40 MG/1
TABLET, DELAYED RELEASE ORAL
Qty: 90 TABLET | Refills: 1 | Status: SHIPPED | OUTPATIENT
Start: 2020-06-08 | End: 2020-12-15

## 2020-08-07 ENCOUNTER — APPOINTMENT (OUTPATIENT)
Dept: LAB | Age: 55
End: 2020-08-07
Attending: FAMILY MEDICINE
Payer: COMMERCIAL

## 2020-08-07 ENCOUNTER — OFFICE VISIT (OUTPATIENT)
Dept: FAMILY MEDICINE CLINIC | Facility: CLINIC | Age: 55
End: 2020-08-07
Payer: COMMERCIAL

## 2020-08-07 ENCOUNTER — LAB ENCOUNTER (OUTPATIENT)
Dept: LAB | Age: 55
End: 2020-08-07
Attending: FAMILY MEDICINE
Payer: COMMERCIAL

## 2020-08-07 VITALS
TEMPERATURE: 98 F | HEART RATE: 76 BPM | WEIGHT: 195 LBS | BODY MASS INDEX: 29.55 KG/M2 | SYSTOLIC BLOOD PRESSURE: 140 MMHG | DIASTOLIC BLOOD PRESSURE: 78 MMHG | HEIGHT: 68 IN

## 2020-08-07 DIAGNOSIS — Z23 NEED FOR VACCINATION: ICD-10-CM

## 2020-08-07 DIAGNOSIS — Z00.00 ADULT GENERAL MEDICAL EXAM: Primary | ICD-10-CM

## 2020-08-07 DIAGNOSIS — R03.0 ELEVATED BLOOD PRESSURE READING: ICD-10-CM

## 2020-08-07 DIAGNOSIS — E78.2 MIXED HYPERLIPIDEMIA: ICD-10-CM

## 2020-08-07 DIAGNOSIS — Z00.00 ADULT GENERAL MEDICAL EXAM: ICD-10-CM

## 2020-08-07 DIAGNOSIS — K21.9 GASTROESOPHAGEAL REFLUX DISEASE WITHOUT ESOPHAGITIS: ICD-10-CM

## 2020-08-07 DIAGNOSIS — E55.9 VITAMIN D DEFICIENCY: ICD-10-CM

## 2020-08-07 LAB
ALBUMIN SERPL-MCNC: 3.8 G/DL (ref 3.4–5)
ALBUMIN/GLOB SERPL: 1 {RATIO} (ref 1–2)
ALP LIVER SERPL-CCNC: 62 U/L (ref 45–117)
ALT SERPL-CCNC: 42 U/L (ref 16–61)
ANION GAP SERPL CALC-SCNC: 5 MMOL/L (ref 0–18)
AST SERPL-CCNC: 24 U/L (ref 15–37)
BASOPHILS # BLD AUTO: 0.06 X10(3) UL (ref 0–0.2)
BASOPHILS NFR BLD AUTO: 1.4 %
BILIRUB SERPL-MCNC: 1.2 MG/DL (ref 0.1–2)
BILIRUB UR QL: NEGATIVE
BUN BLD-MCNC: 15 MG/DL (ref 7–18)
BUN/CREAT SERPL: 15.2 (ref 10–20)
CALCIUM BLD-MCNC: 8.5 MG/DL (ref 8.5–10.1)
CHLORIDE SERPL-SCNC: 107 MMOL/L (ref 98–112)
CHOLEST SMN-MCNC: 146 MG/DL (ref ?–200)
CLARITY UR: CLEAR
CO2 SERPL-SCNC: 25 MMOL/L (ref 21–32)
COLOR UR: YELLOW
COMPLEXED PSA SERPL-MCNC: 0.47 NG/ML (ref ?–4)
CREAT BLD-MCNC: 0.99 MG/DL (ref 0.7–1.3)
DEPRECATED RDW RBC AUTO: 37.2 FL (ref 35.1–46.3)
EOSINOPHIL # BLD AUTO: 0.15 X10(3) UL (ref 0–0.7)
EOSINOPHIL NFR BLD AUTO: 3.5 %
ERYTHROCYTE [DISTWIDTH] IN BLOOD BY AUTOMATED COUNT: 12.3 % (ref 11–15)
GLOBULIN PLAS-MCNC: 3.8 G/DL (ref 2.8–4.4)
GLUCOSE BLD-MCNC: 91 MG/DL (ref 70–99)
GLUCOSE UR-MCNC: NEGATIVE MG/DL
HCT VFR BLD AUTO: 45 % (ref 39–53)
HDLC SERPL-MCNC: 36 MG/DL (ref 40–59)
HGB BLD-MCNC: 15.7 G/DL (ref 13–17.5)
HGB UR QL STRIP.AUTO: NEGATIVE
IMM GRANULOCYTES # BLD AUTO: 0 X10(3) UL (ref 0–1)
IMM GRANULOCYTES NFR BLD: 0 %
KETONES UR-MCNC: NEGATIVE MG/DL
LDLC SERPL CALC-MCNC: 81 MG/DL (ref ?–100)
LEUKOCYTE ESTERASE UR QL STRIP.AUTO: NEGATIVE
LYMPHOCYTES # BLD AUTO: 0.98 X10(3) UL (ref 1–4)
LYMPHOCYTES NFR BLD AUTO: 23 %
M PROTEIN MFR SERPL ELPH: 7.6 G/DL (ref 6.4–8.2)
MCH RBC QN AUTO: 29.3 PG (ref 26–34)
MCHC RBC AUTO-ENTMCNC: 34.9 G/DL (ref 31–37)
MCV RBC AUTO: 84 FL (ref 80–100)
MONOCYTES # BLD AUTO: 0.39 X10(3) UL (ref 0.1–1)
MONOCYTES NFR BLD AUTO: 9.1 %
NEUTROPHILS # BLD AUTO: 2.69 X10 (3) UL (ref 1.5–7.7)
NEUTROPHILS # BLD AUTO: 2.69 X10(3) UL (ref 1.5–7.7)
NEUTROPHILS NFR BLD AUTO: 63 %
NITRITE UR QL STRIP.AUTO: NEGATIVE
NONHDLC SERPL-MCNC: 110 MG/DL (ref ?–130)
OSMOLALITY SERPL CALC.SUM OF ELEC: 284 MOSM/KG (ref 275–295)
PATIENT FASTING Y/N/NP: YES
PATIENT FASTING Y/N/NP: YES
PH UR: 5 [PH] (ref 5–8)
PLATELET # BLD AUTO: 179 10(3)UL (ref 150–450)
POTASSIUM SERPL-SCNC: 3.7 MMOL/L (ref 3.5–5.1)
PROT UR-MCNC: NEGATIVE MG/DL
RBC # BLD AUTO: 5.36 X10(6)UL (ref 4.3–5.7)
SODIUM SERPL-SCNC: 137 MMOL/L (ref 136–145)
SP GR UR STRIP: 1.02 (ref 1–1.03)
TRIGL SERPL-MCNC: 145 MG/DL (ref 30–149)
TSI SER-ACNC: 2.46 MIU/ML (ref 0.36–3.74)
UROBILINOGEN UR STRIP-ACNC: <2
VLDLC SERPL CALC-MCNC: 29 MG/DL (ref 0–30)
WBC # BLD AUTO: 4.3 X10(3) UL (ref 4–11)

## 2020-08-07 PROCEDURE — 81003 URINALYSIS AUTO W/O SCOPE: CPT

## 2020-08-07 PROCEDURE — 93010 ELECTROCARDIOGRAM REPORT: CPT | Performed by: FAMILY MEDICINE

## 2020-08-07 PROCEDURE — 3078F DIAST BP <80 MM HG: CPT | Performed by: FAMILY MEDICINE

## 2020-08-07 PROCEDURE — 36415 COLL VENOUS BLD VENIPUNCTURE: CPT

## 2020-08-07 PROCEDURE — 90471 IMMUNIZATION ADMIN: CPT | Performed by: FAMILY MEDICINE

## 2020-08-07 PROCEDURE — 93005 ELECTROCARDIOGRAM TRACING: CPT

## 2020-08-07 PROCEDURE — 99396 PREV VISIT EST AGE 40-64: CPT | Performed by: FAMILY MEDICINE

## 2020-08-07 PROCEDURE — 85025 COMPLETE CBC W/AUTO DIFF WBC: CPT

## 2020-08-07 PROCEDURE — 84443 ASSAY THYROID STIM HORMONE: CPT

## 2020-08-07 PROCEDURE — 3077F SYST BP >= 140 MM HG: CPT | Performed by: FAMILY MEDICINE

## 2020-08-07 PROCEDURE — 80053 COMPREHEN METABOLIC PANEL: CPT

## 2020-08-07 PROCEDURE — 82306 VITAMIN D 25 HYDROXY: CPT

## 2020-08-07 PROCEDURE — 90750 HZV VACC RECOMBINANT IM: CPT | Performed by: FAMILY MEDICINE

## 2020-08-07 PROCEDURE — 80061 LIPID PANEL: CPT

## 2020-08-07 PROCEDURE — 3008F BODY MASS INDEX DOCD: CPT | Performed by: FAMILY MEDICINE

## 2020-08-07 NOTE — PROGRESS NOTES
Patient ID: Vickie Karimi is a 63year old male. HPI  Patient presents with:  Physical    Last seen by me on 12/5/2019. 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  His job is very active and he has to [employer] MOPERCENT 7.0 04/13/2019    EOPERCENT 3.6 04/13/2019    BAPERCENT 1.3 04/13/2019    NE 2.59 04/13/2019    LYMABS 1.33 04/13/2019    MOABSO 0.31 04/13/2019    EOABSO 0.16 04/13/2019    BAABSO 0.06 04/13/2019       Lab Results   Component Value Date    GLU 1 TSH (mIU/mL)   Date Value   04/13/2019 2.640     TSH (S) (uIU/mL)   Date Value   04/02/2016 3.32       Lab Results   Component Value Date    B12 433 09/13/2018         Lab Results   Component Value Date    VITD 22.1 (L) 04/13/2019     Lab Results   Com children: Not on file      Years of education: Not on file      Highest education level: Not on file    Occupational History      Not on file    Social Needs      Financial resource strain: Not on file      Food insecurity:        Worry: Not on file Refill   • Pantoprazole Sodium 40 MG Oral Tab EC Take one tablet daily before eating. Take before dinner if symptoms are worse at night 90 tablet 1   • Cholecalciferol (VITAMIN D-3 OR) Take by mouth.      • atorvastatin 20 MG Oral Tab TAKE 1 TABLET BY MOUT ADMINISTRATION  -     ZOSTER VACC RECOMBINANT IM NJX    Gastroesophageal reflux disease without esophagitis  Continue PPI  Mixed hyperlipidemia  Continue cholesterol medication  Elevated blood pressure reading  -     EKG 12-LEAD;  Future  -     URINALYSIS W

## 2020-08-09 LAB — 25(OH)D3 SERPL-MCNC: 22.5 NG/ML (ref 30–100)

## 2020-09-11 ENCOUNTER — VIRTUAL PHONE E/M (OUTPATIENT)
Dept: FAMILY MEDICINE CLINIC | Facility: CLINIC | Age: 55
End: 2020-09-11
Payer: COMMERCIAL

## 2020-09-11 DIAGNOSIS — I10 ESSENTIAL HYPERTENSION: Primary | ICD-10-CM

## 2020-09-11 DIAGNOSIS — M25.60 MORNING JOINT STIFFNESS: ICD-10-CM

## 2020-09-11 DIAGNOSIS — E78.2 MIXED HYPERLIPIDEMIA: ICD-10-CM

## 2020-09-11 DIAGNOSIS — K64.4 EXTERNAL HEMORRHOID, BLEEDING: ICD-10-CM

## 2020-09-11 PROCEDURE — 99214 OFFICE O/P EST MOD 30 MIN: CPT | Performed by: FAMILY MEDICINE

## 2020-09-11 RX ORDER — LISINOPRIL 10 MG/1
10 TABLET ORAL DAILY
Qty: 90 TABLET | Refills: 0 | Status: SHIPPED | OUTPATIENT
Start: 2020-09-11 | End: 2020-10-09

## 2020-10-02 ENCOUNTER — LAB ENCOUNTER (OUTPATIENT)
Dept: LAB | Age: 55
End: 2020-10-02
Attending: FAMILY MEDICINE
Payer: COMMERCIAL

## 2020-10-02 DIAGNOSIS — M25.60 MORNING JOINT STIFFNESS: ICD-10-CM

## 2020-10-02 DIAGNOSIS — I10 ESSENTIAL HYPERTENSION: ICD-10-CM

## 2020-10-02 PROCEDURE — 80048 BASIC METABOLIC PNL TOTAL CA: CPT

## 2020-10-02 PROCEDURE — 86431 RHEUMATOID FACTOR QUANT: CPT

## 2020-10-02 PROCEDURE — 86140 C-REACTIVE PROTEIN: CPT

## 2020-10-02 PROCEDURE — 85652 RBC SED RATE AUTOMATED: CPT

## 2020-10-02 PROCEDURE — 86039 ANTINUCLEAR ANTIBODIES (ANA): CPT

## 2020-10-02 PROCEDURE — 86038 ANTINUCLEAR ANTIBODIES: CPT

## 2020-10-02 PROCEDURE — 36415 COLL VENOUS BLD VENIPUNCTURE: CPT

## 2020-10-09 ENCOUNTER — OFFICE VISIT (OUTPATIENT)
Dept: FAMILY MEDICINE CLINIC | Facility: CLINIC | Age: 55
End: 2020-10-09
Payer: COMMERCIAL

## 2020-10-09 VITALS
HEART RATE: 80 BPM | HEIGHT: 68 IN | TEMPERATURE: 97 F | SYSTOLIC BLOOD PRESSURE: 120 MMHG | BODY MASS INDEX: 28.16 KG/M2 | WEIGHT: 185.81 LBS | DIASTOLIC BLOOD PRESSURE: 70 MMHG

## 2020-10-09 DIAGNOSIS — E78.2 MIXED HYPERLIPIDEMIA: ICD-10-CM

## 2020-10-09 DIAGNOSIS — M25.60 MORNING JOINT STIFFNESS: ICD-10-CM

## 2020-10-09 DIAGNOSIS — G89.29 CHRONIC BILATERAL THORACIC BACK PAIN: ICD-10-CM

## 2020-10-09 DIAGNOSIS — Z23 NEED FOR VACCINATION: ICD-10-CM

## 2020-10-09 DIAGNOSIS — M54.6 CHRONIC BILATERAL THORACIC BACK PAIN: ICD-10-CM

## 2020-10-09 DIAGNOSIS — K21.9 GASTROESOPHAGEAL REFLUX DISEASE: ICD-10-CM

## 2020-10-09 DIAGNOSIS — R76.8 POSITIVE ANA (ANTINUCLEAR ANTIBODY): ICD-10-CM

## 2020-10-09 DIAGNOSIS — I10 ESSENTIAL HYPERTENSION: Primary | ICD-10-CM

## 2020-10-09 PROCEDURE — 90471 IMMUNIZATION ADMIN: CPT | Performed by: FAMILY MEDICINE

## 2020-10-09 PROCEDURE — 3078F DIAST BP <80 MM HG: CPT | Performed by: FAMILY MEDICINE

## 2020-10-09 PROCEDURE — 3008F BODY MASS INDEX DOCD: CPT | Performed by: FAMILY MEDICINE

## 2020-10-09 PROCEDURE — 3074F SYST BP LT 130 MM HG: CPT | Performed by: FAMILY MEDICINE

## 2020-10-09 PROCEDURE — 90750 HZV VACC RECOMBINANT IM: CPT | Performed by: FAMILY MEDICINE

## 2020-10-09 PROCEDURE — 99215 OFFICE O/P EST HI 40 MIN: CPT | Performed by: FAMILY MEDICINE

## 2020-10-09 RX ORDER — ATORVASTATIN CALCIUM 20 MG/1
TABLET, FILM COATED ORAL
Qty: 90 TABLET | Refills: 2 | Status: SHIPPED | OUTPATIENT
Start: 2020-10-09 | End: 2020-12-12

## 2020-10-09 RX ORDER — MULTIVITAMIN
TABLET ORAL
COMMUNITY

## 2020-10-09 RX ORDER — LISINOPRIL 10 MG/1
10 TABLET ORAL DAILY
Qty: 90 TABLET | Refills: 0 | Status: SHIPPED | OUTPATIENT
Start: 2020-10-09 | End: 2021-09-30

## 2020-10-09 NOTE — PROGRESS NOTES
Patient ID: Richard Paulino is a 63year old male. HPI  Patient presents with:  Hypertension: follow up  Back Pain  Test Results    Last seen by me on 08/07/2020. 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  He states his back pain i 08/07/2020    TP 7.6 08/07/2020    ALB 3.8 08/07/2020    GLOBULIN 3.8 08/07/2020    AGRATIO 1.0 04/02/2016     10/02/2020    K 4.1 10/02/2020     10/02/2020    CO2 28.0 10/02/2020       Lab Results   Component Value Date     (H) 10/02/20 kg)  09/13/18 : 185 lb (83.9 kg)  03/02/18 : 190 lb (86.2 kg)      BMI Readings from Last 6 Encounters:  10/09/20 : 28.25 kg/m²  08/07/20 : 29.65 kg/m²  12/05/19 : 28.59 kg/m²  04/11/19 : 28.59 kg/m²  09/13/18 : 28.13 kg/m²  03/02/18 : 28.89 kg/m²      BP pulses  Back: No paraspinal tenderness from the thoracic spine to the sacrum  Cardiovascular: Normal rate, regular rhythm and normal heart sounds. Pulmonary/Chest: Effort normal and breath sounds normal. No respiratory distress.    Neurological: Patient personally performed the services described in this documentation. All medical record entries made by the scribe were at my direction and in my presence.   I have reviewed the chart and discharge instructions (if applicable) and agree that the record reflec

## 2020-10-12 ENCOUNTER — LAB ENCOUNTER (OUTPATIENT)
Dept: LAB | Facility: HOSPITAL | Age: 55
End: 2020-10-12
Attending: FAMILY MEDICINE
Payer: COMMERCIAL

## 2020-10-12 DIAGNOSIS — I10 ESSENTIAL HYPERTENSION: ICD-10-CM

## 2020-10-12 PROCEDURE — 36415 COLL VENOUS BLD VENIPUNCTURE: CPT

## 2020-10-12 PROCEDURE — 80048 BASIC METABOLIC PNL TOTAL CA: CPT

## 2020-10-16 ENCOUNTER — OFFICE VISIT (OUTPATIENT)
Dept: RHEUMATOLOGY | Facility: CLINIC | Age: 55
End: 2020-10-16
Payer: COMMERCIAL

## 2020-10-16 ENCOUNTER — LAB ENCOUNTER (OUTPATIENT)
Dept: LAB | Facility: HOSPITAL | Age: 55
End: 2020-10-16
Attending: INTERNAL MEDICINE
Payer: COMMERCIAL

## 2020-10-16 VITALS
HEART RATE: 84 BPM | BODY MASS INDEX: 28.04 KG/M2 | HEIGHT: 68 IN | WEIGHT: 185 LBS | SYSTOLIC BLOOD PRESSURE: 136 MMHG | DIASTOLIC BLOOD PRESSURE: 87 MMHG

## 2020-10-16 DIAGNOSIS — R76.8 POSITIVE ANA (ANTINUCLEAR ANTIBODY): ICD-10-CM

## 2020-10-16 DIAGNOSIS — R76.8 POSITIVE ANA (ANTINUCLEAR ANTIBODY): Primary | ICD-10-CM

## 2020-10-16 PROCEDURE — 3008F BODY MASS INDEX DOCD: CPT | Performed by: INTERNAL MEDICINE

## 2020-10-16 PROCEDURE — 86235 NUCLEAR ANTIGEN ANTIBODY: CPT

## 2020-10-16 PROCEDURE — 36415 COLL VENOUS BLD VENIPUNCTURE: CPT

## 2020-10-16 PROCEDURE — 86225 DNA ANTIBODY NATIVE: CPT

## 2020-10-16 PROCEDURE — 99244 OFF/OP CNSLTJ NEW/EST MOD 40: CPT | Performed by: INTERNAL MEDICINE

## 2020-10-16 PROCEDURE — 3079F DIAST BP 80-89 MM HG: CPT | Performed by: INTERNAL MEDICINE

## 2020-10-16 PROCEDURE — 3075F SYST BP GE 130 - 139MM HG: CPT | Performed by: INTERNAL MEDICINE

## 2020-10-16 NOTE — PROGRESS NOTES
Krystle Moise is a 63year old male who presents for Patient presents with:  Consult  Joint Pain: worse in the morning, back pain. Has to be active for pain to somewhat go away.   Test Results: ERICA positive  Hand Pain: Stiffness  Back Pain: Feels like tho History:  Social History    Tobacco Use      Smoking status: Former Smoker        Packs/day: 0.15        Years: 0.00        Pack years: 0        Quit date: 2016        Years since quittin.3      Smokeless tobacco: Never Used    Alcohol use: No    D ERICA SCREEN      Negative Positive (A)   RHEUMATOID FACTOR      <15 IU/mL <10     IMAGING:     XR C spine 2018:  1. There is reversal of the normal cervical lordosis. Mild spondylosis at C5-C6. No fracture or subluxation.  Grossly patent neural foramina bi

## 2020-10-16 NOTE — PATIENT INSTRUCTIONS
You were seen today for positive ERICA  You have no symptoms of lupus but will get some more blood work just to make sure  We will let you know the results once I get them

## 2020-12-12 DIAGNOSIS — E78.2 MIXED HYPERLIPIDEMIA: ICD-10-CM

## 2020-12-13 RX ORDER — ATORVASTATIN CALCIUM 20 MG/1
TABLET, FILM COATED ORAL
Qty: 90 TABLET | Refills: 2 | Status: SHIPPED | OUTPATIENT
Start: 2020-12-13 | End: 2021-09-13

## 2020-12-14 DIAGNOSIS — K21.9 GASTROESOPHAGEAL REFLUX DISEASE: ICD-10-CM

## 2020-12-15 RX ORDER — PANTOPRAZOLE SODIUM 40 MG/1
TABLET, DELAYED RELEASE ORAL
Qty: 90 TABLET | Refills: 1 | Status: SHIPPED | OUTPATIENT
Start: 2020-12-15 | End: 2021-06-14

## 2021-01-04 ENCOUNTER — IMMUNIZATION (OUTPATIENT)
Dept: LAB | Facility: HOSPITAL | Age: 56
End: 2021-01-04
Attending: PREVENTIVE MEDICINE

## 2021-01-04 DIAGNOSIS — Z23 NEED FOR VACCINATION: ICD-10-CM

## 2021-01-04 PROCEDURE — 0011A SARSCOV2 VAC 100MCG/0.5ML IM: CPT

## 2021-02-01 ENCOUNTER — IMMUNIZATION (OUTPATIENT)
Dept: LAB | Facility: HOSPITAL | Age: 56
End: 2021-02-01
Attending: PREVENTIVE MEDICINE

## 2021-02-01 DIAGNOSIS — Z23 NEED FOR VACCINATION: Primary | ICD-10-CM

## 2021-02-01 PROCEDURE — 0012A SARSCOV2 VAC 100MCG/0.5ML IM: CPT

## 2021-06-11 DIAGNOSIS — K21.9 GASTROESOPHAGEAL REFLUX DISEASE: ICD-10-CM

## 2021-06-14 RX ORDER — PANTOPRAZOLE SODIUM 40 MG/1
TABLET, DELAYED RELEASE ORAL
Qty: 90 TABLET | Refills: 1 | Status: SHIPPED | OUTPATIENT
Start: 2021-06-14 | End: 2021-12-11

## 2021-09-12 DIAGNOSIS — E78.2 MIXED HYPERLIPIDEMIA: ICD-10-CM

## 2021-09-13 NOTE — TELEPHONE ENCOUNTER
Please review. Protocol failed / No protocol.     Requested Prescriptions   Pending Prescriptions Disp Refills    ATORVASTATIN 20 MG Oral Tab [Pharmacy Med Name: ATORVASTATIN 20 MG TABLET] 90 tablet 2     Sig: TAKE 1 TABLET BY MOUTH EVERY DAY IN THE EVENING        Cholesterol Medication Protocol Failed - 9/12/2021  7:36 AM        Failed - ALT in past 12 months        Failed - LDL in past 12 months        Failed - Last ALT < 80       Lab Results   Component Value Date    ALT 42 08/07/2020             Failed - Last LDL < 130     Lab Results   Component Value Date    LDL 81 08/07/2020               Passed - Appointment in past 12 or next 3 months                  Recent Outpatient Visits              11 months ago Positive ERICA (antinuclear antibody)    [facility], [address], Maggy Nascimento MD    Office Visit    11 months ago [facility]    [facility], [address], [address], [place], [place]    Office Visit    1 year ago Essential hypertension    [facility], [address], [address], [place], [place]    Virtual Phone E/M    1 year ago Adult general medical exam    [facility], [address], [address], [place], [place]    Office Visit    1 year ago Dyspnea, unspecified type    [facility], [address], [address], [place], [place]    Office Visit

## 2021-09-15 RX ORDER — ATORVASTATIN CALCIUM 20 MG/1
TABLET, FILM COATED ORAL
Qty: 90 TABLET | Refills: 0 | Status: SHIPPED | OUTPATIENT
Start: 2021-09-15 | End: 2021-12-08

## 2021-09-30 ENCOUNTER — OFFICE VISIT (OUTPATIENT)
Dept: FAMILY MEDICINE CLINIC | Facility: CLINIC | Age: 56
End: 2021-09-30
Payer: COMMERCIAL

## 2021-09-30 ENCOUNTER — HOSPITAL ENCOUNTER (OUTPATIENT)
Dept: GENERAL RADIOLOGY | Age: 56
Discharge: HOME | End: 2021-09-30
Attending: FAMILY MEDICINE
Payer: COMMERCIAL

## 2021-09-30 VITALS
DIASTOLIC BLOOD PRESSURE: 74 MMHG | HEIGHT: 68 IN | BODY MASS INDEX: 29.5 KG/M2 | TEMPERATURE: 99 F | WEIGHT: 194.63 LBS | SYSTOLIC BLOOD PRESSURE: 132 MMHG | HEART RATE: 94 BPM

## 2021-09-30 DIAGNOSIS — M54.50 CHRONIC BILATERAL LOW BACK PAIN WITHOUT SCIATICA: ICD-10-CM

## 2021-09-30 DIAGNOSIS — K21.9 GASTROESOPHAGEAL REFLUX DISEASE, UNSPECIFIED WHETHER ESOPHAGITIS PRESENT: ICD-10-CM

## 2021-09-30 DIAGNOSIS — E55.9 VITAMIN D DEFICIENCY: ICD-10-CM

## 2021-09-30 DIAGNOSIS — G89.29 CHRONIC BILATERAL LOW BACK PAIN WITHOUT SCIATICA: ICD-10-CM

## 2021-09-30 DIAGNOSIS — Z00.00 ADULT GENERAL MEDICAL EXAM: Primary | ICD-10-CM

## 2021-09-30 DIAGNOSIS — E78.2 MIXED HYPERLIPIDEMIA: ICD-10-CM

## 2021-09-30 PROCEDURE — 99396 PREV VISIT EST AGE 40-64: CPT | Performed by: FAMILY MEDICINE

## 2021-09-30 PROCEDURE — 3075F SYST BP GE 130 - 139MM HG: CPT | Performed by: FAMILY MEDICINE

## 2021-09-30 PROCEDURE — 3078F DIAST BP <80 MM HG: CPT | Performed by: FAMILY MEDICINE

## 2021-09-30 PROCEDURE — 99212 OFFICE O/P EST SF 10 MIN: CPT | Performed by: FAMILY MEDICINE

## 2021-09-30 PROCEDURE — 3008F BODY MASS INDEX DOCD: CPT | Performed by: FAMILY MEDICINE

## 2021-09-30 PROCEDURE — 72110 X-RAY EXAM L-2 SPINE 4/>VWS: CPT | Performed by: FAMILY MEDICINE

## 2021-09-30 RX ORDER — CYCLOBENZAPRINE HCL 10 MG
10 TABLET ORAL NIGHTLY
Qty: 30 TABLET | Refills: 1 | Status: SHIPPED | OUTPATIENT
Start: 2021-09-30 | End: 2021-10-30

## 2021-09-30 NOTE — PROGRESS NOTES
Patient ID: Milton Santacruz is a 61year old male. HPI  Patient presents with:  Physical    Last physical on 8/7/2020. Pt works at [email or web address] and does not smoke. Pt c/o low back pain x3 months.  Pt's back muscles feel stiff when he wakes up LYMABS 0.98 (L) 08/07/2020    MOABSO 0.39 08/07/2020    EOABSO 0.15 08/07/2020    BAABSO 0.06 08/07/2020       Lab Results   Component Value Date     (H) 10/12/2020    BUN 15 10/12/2020    BUNCREA 13.9 10/12/2020    CREATSERUM 1.08 10/12/2020    ANI Value   08/07/2020 2.460     TSH (S) (uIU/mL)   Date Value   04/02/2016 3.32       Lab Results   Component Value Date    B12 433 09/13/2018       Lab Results   Component Value Date    VITD 22.5 (L) 08/07/2020     Lab Results   Component Value Date    PSA 0 Blood Transfusions: Not Asked        Caffeine Concern: Yes          4 cups coffee/day        Occupational Exposure: Not Asked        Hobby Hazards: Not Asked        Sleep Concern: Not Asked        Stress Concern: Not Asked        Weight Concern: Not Asked Oral Tab EC TAKE 1 TABLET BY MOUTH EVERY DAY BEFORE EATING DINNER 90 tablet 1   • Multiple Vitamin (MULTI-VITAMIN DAILY) Oral Tab Take by mouth. • Cholecalciferol (VITAMIN D-3 OR) Take by mouth.        Allergies:No Known Allergies   PHYSICAL EXAM:   Marco A hyperlipidemia  Continue medication daily  Gastroesophageal reflux disease, unspecified whether esophagitis present  Takes medication as needed    Vitamin D deficiency  -     VITAMIN D; Future  Does not take vitamin D over-the-counter on a regular basis  C

## 2021-10-03 ENCOUNTER — LAB ENCOUNTER (OUTPATIENT)
Dept: LAB | Facility: HOSPITAL | Age: 56
End: 2021-10-03
Attending: FAMILY MEDICINE
Payer: COMMERCIAL

## 2021-10-03 DIAGNOSIS — E55.9 VITAMIN D DEFICIENCY: ICD-10-CM

## 2021-10-03 DIAGNOSIS — Z00.00 ADULT GENERAL MEDICAL EXAM: ICD-10-CM

## 2021-10-03 PROCEDURE — 80053 COMPREHEN METABOLIC PANEL: CPT

## 2021-10-03 PROCEDURE — 36415 COLL VENOUS BLD VENIPUNCTURE: CPT

## 2021-10-03 PROCEDURE — 80061 LIPID PANEL: CPT

## 2021-10-03 PROCEDURE — 82306 VITAMIN D 25 HYDROXY: CPT

## 2021-10-03 PROCEDURE — 84443 ASSAY THYROID STIM HORMONE: CPT

## 2021-10-03 PROCEDURE — 84481 FREE ASSAY (FT-3): CPT | Performed by: FAMILY MEDICINE

## 2021-10-03 PROCEDURE — 84439 ASSAY OF FREE THYROXINE: CPT | Performed by: FAMILY MEDICINE

## 2021-10-03 PROCEDURE — 85025 COMPLETE CBC W/AUTO DIFF WBC: CPT

## 2021-10-06 ENCOUNTER — LAB ENCOUNTER (OUTPATIENT)
Dept: LAB | Facility: HOSPITAL | Age: 56
End: 2021-10-06
Attending: FAMILY MEDICINE
Payer: COMMERCIAL

## 2021-10-06 DIAGNOSIS — R79.89 ELEVATED TSH: ICD-10-CM

## 2021-10-06 PROCEDURE — 36415 COLL VENOUS BLD VENIPUNCTURE: CPT

## 2021-10-06 PROCEDURE — 86376 MICROSOMAL ANTIBODY EACH: CPT

## 2021-10-06 PROCEDURE — 84443 ASSAY THYROID STIM HORMONE: CPT

## 2021-11-23 NOTE — PROGRESS NOTES
TELEPHONE VISIT PROGRESS NOTE  Todays date: 9/11/2020 12:42 PM        Most recent Nurse Triage message / Arcelia Karimi message from patient:        Jaleel Gotti DO   Physician   Family Medicine   Telephone Encounter   Signed   Encounter Date:  9/5/2020 phone and would rather have a designated person speak for them and in that case we will be speaking to that designated person and all parties involved understand the disclaimer that goes along with the consent for the virtual check-in service as stated abo and oriented x 3  Patient was responding to questions appropriately. Patient did not sound short of breath. Assessment / Plan:      I am starting the pt on lisinopril 10mg once daily.     Diagnoses and all orders for this visit:    Essential hyper tobacco: Never Used    Alcohol use: No    Drug use: No     Reviewed Current Medications:  Current Outpatient Medications   Medication Sig Dispense Refill   • Pantoprazole Sodium 40 MG Oral Tab EC Take one tablet daily before eating.   Take before dinner if O-Z Plasty Text: The defect edges were debeveled with a #15 scalpel blade.  Given the location of the defect, shape of the defect and the proximity to free margins an O-Z plasty (double transposition flap) was deemed most appropriate.  Using a sterile surgical marker, the appropriate transposition flaps were drawn incorporating the defect and placing the expected incisions within the relaxed skin tension lines where possible.    The area thus outlined was incised deep to adipose tissue with a #15 scalpel blade.  The skin margins were undermined to an appropriate distance in all directions utilizing iris scissors.  Hemostasis was achieved with electrocautery.  The flaps were then transposed into place, one clockwise and the other counterclockwise, and anchored with interrupted buried subcutaneous sutures.

## 2021-12-08 DIAGNOSIS — E78.2 MIXED HYPERLIPIDEMIA: ICD-10-CM

## 2021-12-08 RX ORDER — ATORVASTATIN CALCIUM 20 MG/1
TABLET, FILM COATED ORAL
Qty: 90 TABLET | Refills: 1 | Status: SHIPPED | OUTPATIENT
Start: 2021-12-08 | End: 2022-06-14

## 2021-12-08 NOTE — TELEPHONE ENCOUNTER
Refill passed per [address] protocol.       Requested Prescriptions   Pending Prescriptions Disp Refills    ATORVASTATIN 20 MG Oral Tab [Pharmacy Med Name: ATORVASTATIN 20 MG TABLET] 90 tablet 0     Sig: TAKE 1 TABLET BY MOUTH EVERY DAY IN THE EVENING        Cholesterol Medication Protocol Passed - 12/8/2021 10:32 AM        Passed - ALT in past 12 months        Passed - LDL in past 12 months        Passed - Last ALT < 80       Lab Results   Component Value Date    ALT 48 10/03/2021             Passed - Last LDL < 130     Lab Results   Component Value Date    LDL 79 10/03/2021               Passed - Appointment in past 12 or next 3 months                   Recent Outpatient Visits              2 months ago Adult general medical exam    [address], [address], [place]    Office Visit    1 year ago Positive ERICA (antinuclear antibody)    [facility], [address], Sunny Hernandez MD    Office Visit    1 year ago Essential hypertension    [address], [place], [address], [place]    Office Visit    1 year ago Essential hypertension    [address], [place]    [employer] E/M    1 year ago Adult general medical exam    [address], [address], [address], [place]    Office Visit

## 2021-12-09 DIAGNOSIS — K21.9 GASTROESOPHAGEAL REFLUX DISEASE: ICD-10-CM

## 2021-12-11 RX ORDER — PANTOPRAZOLE SODIUM 40 MG/1
TABLET, DELAYED RELEASE ORAL
Qty: 90 TABLET | Refills: 1 | Status: SHIPPED | OUTPATIENT
Start: 2021-12-11

## 2021-12-23 ENCOUNTER — IMMUNIZATION (OUTPATIENT)
Dept: LAB | Facility: HOSPITAL | Age: 56
End: 2021-12-23
Attending: EMERGENCY MEDICINE
Payer: COMMERCIAL

## 2021-12-23 DIAGNOSIS — Z23 NEED FOR VACCINATION: Primary | ICD-10-CM

## 2021-12-23 PROCEDURE — 0064A SARSCOV2 VAC 50MCG/0.25ML IM: CPT

## 2022-03-18 RX ORDER — ERGOCALCIFEROL 1.25 MG/1
50000 CAPSULE ORAL WEEKLY
Qty: 12 CAPSULE | Refills: 1 | Status: SHIPPED | OUTPATIENT
Start: 2022-03-18

## 2022-03-18 NOTE — TELEPHONE ENCOUNTER
Please review. No protocol.   Requested Prescriptions   Pending Prescriptions Disp Refills    ERGOCALCIFEROL 1.25 MG (17057 UT) Oral Cap [Pharmacy Med Name: VITAMIN D2 1.25MG(50,000 UNIT)] 12 capsule 1     Sig: TAKE 1 CAPSULE BY MOUTH ONE TIME PER WEEK        There is no refill protocol information for this order           Recent Outpatient Visits              5 months ago [facility] general medical exam    [facility], [address], [address], [place],     Office Visit    1 year ago Positive ERICA (antinuclear antibody)    [facility], [address], Jasiel Nascimento MD    Office Visit    1 year ago Essential hypertension    [facility], [address], [address], [place]    Office Visit    1 year ago Essential hypertension    [facility], [address], [address], [place],     Virtual Phone E/M    1 year ago Adult general medical exam    [facility], [address], [address], [place],     Office Visit

## 2022-06-14 DIAGNOSIS — E78.2 MIXED HYPERLIPIDEMIA: ICD-10-CM

## 2022-06-14 DIAGNOSIS — K21.9 GASTROESOPHAGEAL REFLUX DISEASE: ICD-10-CM

## 2022-06-14 RX ORDER — ATORVASTATIN CALCIUM 20 MG/1
TABLET, FILM COATED ORAL
Qty: 90 TABLET | Refills: 1 | Status: SHIPPED | OUTPATIENT
Start: 2022-06-14

## 2022-06-14 RX ORDER — PANTOPRAZOLE SODIUM 40 MG/1
TABLET, DELAYED RELEASE ORAL
Qty: 90 TABLET | Refills: 1 | Status: SHIPPED | OUTPATIENT
Start: 2022-06-14

## 2022-06-14 NOTE — TELEPHONE ENCOUNTER
Refill passed per [facility] protocol.   Requested Prescriptions   Pending Prescriptions Disp Refills    PANTOPRAZOLE 40 MG Oral Tab EC [Pharmacy Med Name: PANTOPRAZOLE SOD DR 40 MG TAB] 90 tablet 1     Sig: TAKE 1 TABLET BY MOUTH EVERY DAY BEFORE EATING DINNER        Gastrointestional Medication Protocol Passed - 6/14/2022 12:34 AM        Passed - Appointment in past 12 or next 3 months            Recent Outpatient Visits              8 months ago Adult general medical exam    [address], [place],     Office Visit    1 year ago Positive ERICA (antinuclear antibody)    [facility], [address], Damian Nascimento MD    Office Visit    1 year ago Essential hypertension    [place] [employer] [facility], [address], [address], [place]    Office Visit    1 year ago Essential hypertension    [place] [employer] [facility], [address], [address], [place], [place]    [employer] E[facility]    1 year ago Adult general medical exam    [place] [employer] [facility], [address], [address], [place], [place]    Office Visit

## 2022-06-14 NOTE — TELEPHONE ENCOUNTER
Refill passed per [employer] [facility] protocol.   Requested Prescriptions   Pending Prescriptions Disp Refills    ATORVASTATIN 20 MG Oral Tab [Pharmacy Med Name: ATORVASTATIN 20 MG TABLET] 90 tablet 1     Sig: TAKE 1 TABLET BY MOUTH EVERY DAY IN THE EVENING        Cholesterol Medication Protocol Passed - 6/14/2022 12:34 AM        Passed - ALT in past 12 months        Passed - LDL in past 12 months        Passed - Last ALT < 80       Lab Results   Component Value Date    ALT 48 10/03/2021             Passed - Last LDL < 130     Lab Results   Component Value Date    LDL 79 10/03/2021               Passed - Appointment in past 12 or next 3 months            Recent Outpatient Visits              8 months ago Adult general medical exam    [address], [address], [place]    Office Visit    1 year ago Positive ERICA (antinuclear antibody)    [facility], [address], Leoncio Nascimento MD    Office Visit    1 year ago Essential hypertension    [place] [facility] [facility], [address], [address], [place]    Office Visit    1 year ago Essential hypertension    [place] [facility] [facility], [address], [address], [place]    [employer] E[facility]    1 year ago Adult general medical exam    [place] [employer] [facility], [address], [address], [place]    Office Visit

## 2022-08-01 ENCOUNTER — TELEPHONE (OUTPATIENT)
Dept: INTERNAL MEDICINE CLINIC | Facility: HOSPITAL | Age: 57
End: 2022-08-01

## 2022-08-06 ENCOUNTER — TELEPHONE (OUTPATIENT)
Dept: INTERNAL MEDICINE CLINIC | Facility: HOSPITAL | Age: 57
End: 2022-08-06

## 2022-09-01 DIAGNOSIS — E55.9 VITAMIN D DEFICIENCY: ICD-10-CM

## 2022-09-01 NOTE — TELEPHONE ENCOUNTER
Please review. Protocol failed / No protocol.    Last OV 9/30/21  Last Vitamin D level 10/3/2021    Requested Prescriptions   Pending Prescriptions Disp Refills    ERGOCALCIFEROL 1.25 MG (79767 UT) Oral Cap [Pharmacy Med Name: VITAMIN D2 1.25MG(50,000 UNIT)] 12 capsule 1     Sig: TAKE 1 CAPSULE BY MOUTH ONE TIME PER WEEK        There is no refill protocol information for this order          Recent Outpatient Visits              11 months ago Adult general medical exam    [address], [address], [place], [place]    Office Visit    1 year ago Positive ERICA (antinuclear antibody)    [facility], [address], Peter Nascimento MD    Office Visit    1 year ago Essential hypertension    [address], [address], [place], [place]    Office Visit    1 year ago Essential hypertension    [address], [address], [place], [place]    [employer] [facility]    2 years ago Adult general medical exam    [address], [address], [place], [place]    Office Visit

## 2022-09-04 RX ORDER — ERGOCALCIFEROL 1.25 MG/1
CAPSULE ORAL
Qty: 12 CAPSULE | Refills: 0 | Status: SHIPPED | OUTPATIENT
Start: 2022-09-04

## 2022-10-24 ENCOUNTER — IMMUNIZATION (OUTPATIENT)
Dept: LAB | Facility: HOSPITAL | Age: 57
End: 2022-10-24
Attending: PREVENTIVE MEDICINE
Payer: COMMERCIAL

## 2022-10-24 DIAGNOSIS — Z23 NEED FOR VACCINATION: Primary | ICD-10-CM

## 2022-10-24 PROCEDURE — 0134A SARSCOV2 VAC BVL 50MCG/0.5ML: CPT

## 2022-10-24 PROCEDURE — 90471 IMMUNIZATION ADMIN: CPT

## 2023-01-05 ENCOUNTER — LAB ENCOUNTER (OUTPATIENT)
Dept: LAB | Age: 58
End: 2023-01-05
Attending: FAMILY MEDICINE
Payer: COMMERCIAL

## 2023-01-05 ENCOUNTER — OFFICE VISIT (OUTPATIENT)
Dept: FAMILY MEDICINE CLINIC | Facility: CLINIC | Age: 58
End: 2023-01-05
Payer: COMMERCIAL

## 2023-01-05 VITALS
HEIGHT: 68 IN | SYSTOLIC BLOOD PRESSURE: 137 MMHG | DIASTOLIC BLOOD PRESSURE: 80 MMHG | HEART RATE: 81 BPM | TEMPERATURE: 97 F | BODY MASS INDEX: 29.1 KG/M2 | WEIGHT: 192 LBS

## 2023-01-05 DIAGNOSIS — E78.2 MIXED HYPERLIPIDEMIA: ICD-10-CM

## 2023-01-05 DIAGNOSIS — G89.29 CHRONIC BILATERAL LOW BACK PAIN WITHOUT SCIATICA: ICD-10-CM

## 2023-01-05 DIAGNOSIS — E55.9 VITAMIN D DEFICIENCY: ICD-10-CM

## 2023-01-05 DIAGNOSIS — I10 ESSENTIAL HYPERTENSION: ICD-10-CM

## 2023-01-05 DIAGNOSIS — Z00.00 ADULT GENERAL MEDICAL EXAM: Primary | ICD-10-CM

## 2023-01-05 DIAGNOSIS — K21.9 GASTROESOPHAGEAL REFLUX DISEASE, UNSPECIFIED WHETHER ESOPHAGITIS PRESENT: ICD-10-CM

## 2023-01-05 DIAGNOSIS — Z00.00 ADULT GENERAL MEDICAL EXAM: ICD-10-CM

## 2023-01-05 DIAGNOSIS — M54.50 CHRONIC BILATERAL LOW BACK PAIN WITHOUT SCIATICA: ICD-10-CM

## 2023-01-05 DIAGNOSIS — K64.4 EXTERNAL HEMORRHOIDS: ICD-10-CM

## 2023-01-05 LAB
ALBUMIN SERPL-MCNC: 3.9 G/DL (ref 3.4–5)
ALBUMIN/GLOB SERPL: 1 {RATIO} (ref 1–2)
ALP LIVER SERPL-CCNC: 69 U/L
ALT SERPL-CCNC: 42 U/L
ANION GAP SERPL CALC-SCNC: 5 MMOL/L (ref 0–18)
AST SERPL-CCNC: 24 U/L (ref 15–37)
BASOPHILS # BLD AUTO: 0.06 X10(3) UL (ref 0–0.2)
BASOPHILS NFR BLD AUTO: 1.3 %
BILIRUB SERPL-MCNC: 1 MG/DL (ref 0.1–2)
BUN BLD-MCNC: 17 MG/DL (ref 7–18)
BUN/CREAT SERPL: 17.3 (ref 10–20)
CALCIUM BLD-MCNC: 9.2 MG/DL (ref 8.5–10.1)
CHLORIDE SERPL-SCNC: 108 MMOL/L (ref 98–112)
CHOLEST SERPL-MCNC: 153 MG/DL (ref ?–200)
CO2 SERPL-SCNC: 25 MMOL/L (ref 21–32)
COMPLEXED PSA SERPL-MCNC: 0.61 NG/ML (ref ?–4)
CREAT BLD-MCNC: 0.98 MG/DL
DEPRECATED RDW RBC AUTO: 38.7 FL (ref 35.1–46.3)
EOSINOPHIL # BLD AUTO: 0.14 X10(3) UL (ref 0–0.7)
EOSINOPHIL NFR BLD AUTO: 3.1 %
ERYTHROCYTE [DISTWIDTH] IN BLOOD BY AUTOMATED COUNT: 12.4 % (ref 11–15)
FASTING PATIENT LIPID ANSWER: YES
FASTING STATUS PATIENT QL REPORTED: YES
GFR SERPLBLD BASED ON 1.73 SQ M-ARVRAT: 89 ML/MIN/1.73M2 (ref 60–?)
GLOBULIN PLAS-MCNC: 4 G/DL (ref 2.8–4.4)
GLUCOSE BLD-MCNC: 127 MG/DL (ref 70–99)
HCT VFR BLD AUTO: 47.2 %
HDLC SERPL-MCNC: 38 MG/DL (ref 40–59)
HGB BLD-MCNC: 16 G/DL
IMM GRANULOCYTES # BLD AUTO: 0.01 X10(3) UL (ref 0–1)
IMM GRANULOCYTES NFR BLD: 0.2 %
LDLC SERPL CALC-MCNC: 89 MG/DL (ref ?–100)
LYMPHOCYTES # BLD AUTO: 0.97 X10(3) UL (ref 1–4)
LYMPHOCYTES NFR BLD AUTO: 21.7 %
MCH RBC QN AUTO: 28.9 PG (ref 26–34)
MCHC RBC AUTO-ENTMCNC: 33.9 G/DL (ref 31–37)
MCV RBC AUTO: 85.2 FL
MONOCYTES # BLD AUTO: 0.35 X10(3) UL (ref 0.1–1)
MONOCYTES NFR BLD AUTO: 7.8 %
NEUTROPHILS # BLD AUTO: 2.93 X10 (3) UL (ref 1.5–7.7)
NEUTROPHILS # BLD AUTO: 2.93 X10(3) UL (ref 1.5–7.7)
NEUTROPHILS NFR BLD AUTO: 65.9 %
NONHDLC SERPL-MCNC: 115 MG/DL (ref ?–130)
OSMOLALITY SERPL CALC.SUM OF ELEC: 289 MOSM/KG (ref 275–295)
PLATELET # BLD AUTO: 193 10(3)UL (ref 150–450)
POTASSIUM SERPL-SCNC: 3.8 MMOL/L (ref 3.5–5.1)
PROT SERPL-MCNC: 7.9 G/DL (ref 6.4–8.2)
RBC # BLD AUTO: 5.54 X10(6)UL
SODIUM SERPL-SCNC: 138 MMOL/L (ref 136–145)
TRIGL SERPL-MCNC: 149 MG/DL (ref 30–149)
TSI SER-ACNC: 2.42 MIU/ML (ref 0.36–3.74)
VIT D+METAB SERPL-MCNC: 34.3 NG/ML (ref 30–100)
VLDLC SERPL CALC-MCNC: 24 MG/DL (ref 0–30)
WBC # BLD AUTO: 4.5 X10(3) UL (ref 4–11)

## 2023-01-05 PROCEDURE — 3075F SYST BP GE 130 - 139MM HG: CPT | Performed by: FAMILY MEDICINE

## 2023-01-05 PROCEDURE — 80061 LIPID PANEL: CPT

## 2023-01-05 PROCEDURE — 84443 ASSAY THYROID STIM HORMONE: CPT

## 2023-01-05 PROCEDURE — 81374 HLA I TYPING 1 ANTIGEN LR: CPT

## 2023-01-05 PROCEDURE — 80053 COMPREHEN METABOLIC PANEL: CPT

## 2023-01-05 PROCEDURE — 82306 VITAMIN D 25 HYDROXY: CPT

## 2023-01-05 PROCEDURE — 99396 PREV VISIT EST AGE 40-64: CPT | Performed by: FAMILY MEDICINE

## 2023-01-05 PROCEDURE — 36415 COLL VENOUS BLD VENIPUNCTURE: CPT

## 2023-01-05 PROCEDURE — 85025 COMPLETE CBC W/AUTO DIFF WBC: CPT

## 2023-01-05 PROCEDURE — 99213 OFFICE O/P EST LOW 20 MIN: CPT | Performed by: FAMILY MEDICINE

## 2023-01-05 PROCEDURE — 3079F DIAST BP 80-89 MM HG: CPT | Performed by: FAMILY MEDICINE

## 2023-01-05 PROCEDURE — 3008F BODY MASS INDEX DOCD: CPT | Performed by: FAMILY MEDICINE

## 2023-01-05 PROCEDURE — 83036 HEMOGLOBIN GLYCOSYLATED A1C: CPT | Performed by: FAMILY MEDICINE

## 2023-01-05 RX ORDER — CYCLOBENZAPRINE HCL 10 MG
10 TABLET ORAL NIGHTLY
Qty: 90 TABLET | Refills: 0 | Status: SHIPPED | OUTPATIENT
Start: 2023-01-05 | End: 2023-02-04

## 2023-01-05 RX ORDER — LISINOPRIL 10 MG/1
10 TABLET ORAL DAILY
Qty: 90 TABLET | Refills: 1 | Status: SHIPPED | OUTPATIENT
Start: 2023-01-05 | End: 2023-12-31

## 2023-01-05 NOTE — PATIENT INSTRUCTIONS
HEALTH TIPS     Exercise, work on your diet, watch your portion sizes. Avoid eating late at night. Make sure to EAT BREAKFAST as people who skip breakfast do not lose weight. I myself have 3-4 \"Brown 'N Serve\" sausages every morning and you can microwave these for 1 minute and eat these quickly and be on your way to work. Eating Oatmeal in the morning 5 times per week also can be very healthy. At lunch and dinner, you are BETTER OFF eating lean pieces of meat such as fish, chicken, ham, turkey, pork and more vegetables along with it. AVOID too many white flour products and carbohydrates such as bread, pasta, rice, potatoes, and tortillas. This is a very good way to lose weight.

## 2023-01-07 LAB — HLA-B27: NEGATIVE

## 2023-01-12 ENCOUNTER — TELEPHONE (OUTPATIENT)
Dept: PHYSICAL THERAPY | Facility: HOSPITAL | Age: 58
End: 2023-01-12

## 2023-01-13 ENCOUNTER — OFFICE VISIT (OUTPATIENT)
Dept: PHYSICAL THERAPY | Facility: HOSPITAL | Age: 58
End: 2023-01-13
Attending: FAMILY MEDICINE
Payer: COMMERCIAL

## 2023-01-13 DIAGNOSIS — M54.50 CHRONIC BILATERAL LOW BACK PAIN WITHOUT SCIATICA: ICD-10-CM

## 2023-01-13 DIAGNOSIS — G89.29 CHRONIC BILATERAL LOW BACK PAIN WITHOUT SCIATICA: ICD-10-CM

## 2023-01-13 PROCEDURE — 97110 THERAPEUTIC EXERCISES: CPT | Performed by: PHYSICAL THERAPIST

## 2023-01-13 PROCEDURE — 97161 PT EVAL LOW COMPLEX 20 MIN: CPT | Performed by: PHYSICAL THERAPIST

## 2023-01-13 NOTE — TELEPHONE ENCOUNTER
•  lisinopril 10 MG Oral Tab, Take 1 tablet (10 mg total) by mouth daily. , Disp: 90 tablet, Rfl: 0    •  Pantoprazole Sodium 40 MG Oral Tab EC, Take one tablet daily before eating.   Take before dinner if symptoms are worse at night, Disp: 90 tablet, Rfl: 1
normal...

## 2023-01-19 ENCOUNTER — OFFICE VISIT (OUTPATIENT)
Dept: PHYSICAL THERAPY | Facility: HOSPITAL | Age: 58
End: 2023-01-19
Attending: FAMILY MEDICINE
Payer: COMMERCIAL

## 2023-01-19 PROCEDURE — 97140 MANUAL THERAPY 1/> REGIONS: CPT | Performed by: PHYSICAL THERAPIST

## 2023-01-19 PROCEDURE — 97110 THERAPEUTIC EXERCISES: CPT | Performed by: PHYSICAL THERAPIST

## 2023-01-27 ENCOUNTER — OFFICE VISIT (OUTPATIENT)
Dept: PHYSICAL THERAPY | Facility: HOSPITAL | Age: 58
End: 2023-01-27
Attending: FAMILY MEDICINE
Payer: COMMERCIAL

## 2023-01-27 PROCEDURE — 97110 THERAPEUTIC EXERCISES: CPT | Performed by: PHYSICAL THERAPIST

## 2023-01-27 PROCEDURE — 97140 MANUAL THERAPY 1/> REGIONS: CPT | Performed by: PHYSICAL THERAPIST

## 2023-02-03 ENCOUNTER — OFFICE VISIT (OUTPATIENT)
Dept: PHYSICAL THERAPY | Facility: HOSPITAL | Age: 58
End: 2023-02-03
Attending: FAMILY MEDICINE
Payer: COMMERCIAL

## 2023-02-03 PROCEDURE — 97140 MANUAL THERAPY 1/> REGIONS: CPT | Performed by: PHYSICAL THERAPIST

## 2023-02-03 PROCEDURE — 97110 THERAPEUTIC EXERCISES: CPT | Performed by: PHYSICAL THERAPIST

## 2023-02-09 ENCOUNTER — OFFICE VISIT (OUTPATIENT)
Dept: PHYSICAL THERAPY | Facility: HOSPITAL | Age: 58
End: 2023-02-09
Attending: FAMILY MEDICINE
Payer: COMMERCIAL

## 2023-02-09 PROCEDURE — 97140 MANUAL THERAPY 1/> REGIONS: CPT | Performed by: PHYSICAL THERAPIST

## 2023-02-09 PROCEDURE — 97110 THERAPEUTIC EXERCISES: CPT | Performed by: PHYSICAL THERAPIST

## 2023-02-09 NOTE — PROGRESS NOTES
2023  Patient Name: Ankita Mclean  YOB: 1965          MRN number:  [id number]  Referring Physician:  Juvencio Arellano    Discharge Summary    Dx:    Chronic bilateral low back pain without sciatica (M54.50,G89.29)        Authorized # of Visits:  12 visits on POC          Next MD visit: none   Fall Risk: standard         Precautions:  general  Medication Changes since last visit?: No    Subjective:  Reports 80% improvement since starting PT. Reports he is feeling much better. State he no longer has pain by the end of his work shift. No longer having pain in the morning. Feels ready for discharge.     Pain Ratin/10 VAS    Objective:     ROM:     Trunk   Eval      2023   Flexion  limited 25%                    WFL   Extension Limited 50% WFL   R Sidebend Limited 25% WFL   L Sidebend WFL WFL   R Rotation NT NT   L Rotation NT NT   R Sideglide WFL WFL   L Sideglide WFL WFL     Repeated Motion Testing: REIL - decreased/better      STRENGTH:   5/5 MMT Scale   Left  Right Comments   Hip Flexion (L2) 5   5    Knee Extension (L3) 5 5    Knee Flexion 5 5    Ankle DF (L4) 5 5    EHL (L5) 5 5    Ankle PF (S1) 5 5    Hip Abduction NT NT    Hip Extension NT NT         2023  Visit #2 2023  Visit #3 2/3/2023  Visit #4 2023  Visit #5   Manual Therapy Central PA's L3-L5    Unilateral PAs L3L4 and L4L5 Central PA's L3-L5    Unilateral PAs L3L4 and L4L5 Central PA's L3-L5    Unilateral PAs L3L4 and L4L5 Central PA's L3-L5    Unilateral PAs L3L4 and L4L5   Therapeutic Exercise REIL    REIL with sag    Prone ball squeeze with exhalation    modified all  Sit to stand with hip hinge with pole behind back    Sit to stand from chair    REIL with sag    REIL with sag with weights under hands to increased ROM    Adductor squeeze with exhale    Wall plank REIL with sag    REIL with shasta with weights    REIL with belt OP    Wall squats    Single leg RDL's    1/2 planks REIL with sag    REIL with shasta with weights    REIL with belt OP    Re-assessment   Therapeutic Activity       Neuromuscular Education       TNE Education       HEP REIL    REIL with sag    Prone ball squeeze with exhale Adductor squeeze with exhale in sitting    Wall plank    PPU's with weights under hands     REIL with belt OP    Wall squats    Single leg RDL's    1/2 planks        Assessment: Victor Manuel Smith has completed 5 visits of PT and reports 80% improvement. He reports he is able to work a full work day without an increase in symptoms and now wakes up without LBP. He is independent and compliant with his HEP and his lower lumbar spine joint mobility is now [facility] s. He also displays full lumbar ROM and proper body mechanics with lifting/carrying. All goals have been met. Therefore DC PT with HEP. Goals: The pt was educated on the plan of care, purpose and individual goals for therapy, precautions for therapy. All questions were answered. 1.  The pt will be independent in their HEP. MET 2/9/2023  2. Centralization of symptoms to the lumbar spine. MET 2/9/2023  3. The pt will be independent in a modified workout program.  MET 2/9/2023  4. The pt will be able to work a full day without an increase in symptoms. MET 2/9/2023  5. The pt will report a 75% decrease in pain. MET 2/9/2023  6. The pt will wake up without morning pain. MET 2/9/2023    Plan:  DC PT at this time    Education or treatment limitation: None  Rehab Potential good    Charges: Man1, TE2      Total Timed Treatment: 38 min  Total Treatment Time: 38 min       Patient was advised of these findings, precautions, and treatment options and has agreed to actively participate in planning and for this course of care. Thank you for your referral. Please co-sign or sign and return this letter via fax as soon as possible to [phone]. If you have any questions, please contact me at Dept: [phone].     Sincerely,  Aicha Joaquin, PT    Electronically signed by therapist: Mony Scruggs, PT    [de-identified] certification required: Yes  I certify the need for these services furnished under this plan of treatment and while under my care.     X___________________________________________________ Date____________________    Certification From: 3/00/1087  To:5/12/2023

## 2023-02-16 ENCOUNTER — APPOINTMENT (OUTPATIENT)
Dept: PHYSICAL THERAPY | Facility: HOSPITAL | Age: 58
End: 2023-02-16
Attending: FAMILY MEDICINE
Payer: COMMERCIAL

## 2023-03-24 DIAGNOSIS — E78.2 MIXED HYPERLIPIDEMIA: ICD-10-CM

## 2023-03-24 RX ORDER — ATORVASTATIN CALCIUM 20 MG/1
TABLET, FILM COATED ORAL
Qty: 90 TABLET | Refills: 3 | Status: SHIPPED | OUTPATIENT
Start: 2023-03-24

## 2023-03-24 NOTE — TELEPHONE ENCOUNTER
Refill passed per [facility] protocol   Requested Prescriptions   Pending Prescriptions Disp Refills    ATORVASTATIN 20 MG Oral Tab [Pharmacy Med Name: ATORVASTATIN 20 MG TABLET] 90 tablet 0     Sig: TAKE 1 TABLET BY MOUTH EVERY DAY IN THE EVENING       Cholesterol Medication Protocol Passed - 3/24/2023 12:51 AM        Passed - ALT in past 12 months        Passed - LDL in past 12 months        Passed - Last ALT < 80     Lab Results   Component Value Date    ALT 42 01/05/2023             Passed - Last LDL < 130     Lab Results   Component Value Date    LDL 89 01/05/2023             Passed - In person appointment or virtual visit in the past 12 mos or appointment in next 3 mos     Recent Outpatient Visits              1 month ago     [address], [place]    Office Visit    1 month ago     [address], [place]    Office Visit    1 month ago     [address], [place]    Office Visit    2 months ago     [address], [place]    Office Visit    2 months ago Chronic bilateral low back pain without sciatica    [facility] [place] [place]    [place] Visit

## 2023-04-08 DIAGNOSIS — G89.29 CHRONIC BILATERAL LOW BACK PAIN WITHOUT SCIATICA: ICD-10-CM

## 2023-04-08 DIAGNOSIS — M54.50 CHRONIC BILATERAL LOW BACK PAIN WITHOUT SCIATICA: ICD-10-CM

## 2023-04-10 NOTE — TELEPHONE ENCOUNTER
Please review. Protocol failed / No protocol. Requested Prescriptions   Pending Prescriptions Disp Refills    CYCLOBENZAPRINE 10 MG Oral Tab [Pharmacy Med Name: CYCLOBENZAPRINE 10 MG TABLET] 90 tablet 0     Sig: Take 1 tablet (10 mg total) by mouth nightly. As needed for muscle relaxation. Can make tired.        There is no refill protocol information for this order          Recent Outpatient Visits              2 months ago     [address] [place], [place]    Office Visit    2 months ago     [address] [place], [place]    Office Visit    2 months ago     [address] [place], [place]    Office Visit    2 months ago     [address] [place], [place]    Office Visit    2 months ago Chronic bilateral low back pain without sciatica    [place], [place]    Office Visit

## 2023-04-13 RX ORDER — CYCLOBENZAPRINE HCL 10 MG
10 TABLET ORAL NIGHTLY
Qty: 90 TABLET | Refills: 0 | Status: SHIPPED | OUTPATIENT
Start: 2023-04-13

## 2023-07-09 DIAGNOSIS — M54.50 CHRONIC BILATERAL LOW BACK PAIN WITHOUT SCIATICA: ICD-10-CM

## 2023-07-09 DIAGNOSIS — G89.29 CHRONIC BILATERAL LOW BACK PAIN WITHOUT SCIATICA: ICD-10-CM

## 2023-07-10 RX ORDER — CYCLOBENZAPRINE HCL 10 MG
10 TABLET ORAL NIGHTLY PRN
Qty: 90 TABLET | Refills: 0 | Status: SHIPPED | OUTPATIENT
Start: 2023-07-10

## 2023-07-10 NOTE — TELEPHONE ENCOUNTER
Please review. Protocol failed / Has no protocol. Requested Prescriptions   Pending Prescriptions Disp Refills    CYCLOBENZAPRINE 10 MG Oral Tab [Pharmacy Med Name: CYCLOBENZAPRINE 10 MG TABLET] 90 tablet 0     Sig: Take 1 tablet (10 mg total) by mouth nightly. As needed for muscle relaxation. Can make tired.        There is no refill protocol information for this order           Recent Outpatient Visits              5 months ago     [address], [place]    Office Visit    5 months ago     [address], [place]    Office Visit    5 months ago     [address], [place]    Office Visit    5 months ago     [address], [place]    Office Visit    5 months ago Chronic bilateral low back pain without sciatica    [facility], [place]    Office Visit

## 2023-10-05 DIAGNOSIS — I10 ESSENTIAL HYPERTENSION: ICD-10-CM

## 2023-10-06 RX ORDER — LISINOPRIL 10 MG/1
10 TABLET ORAL DAILY
Qty: 90 TABLET | Refills: 0 | Status: SHIPPED | OUTPATIENT
Start: 2023-10-06

## 2023-10-06 NOTE — TELEPHONE ENCOUNTER
Please review refill protocol failed/ no protocol  Requested Prescriptions   Pending Prescriptions Disp Refills    LISINOPRIL 10 MG Oral Tab [Pharmacy Med Name: LISINOPRIL 10 MG TABLET] 90 tablet 0     Sig: TAKE 1 TABLET BY MOUTH EVERY DAY       Hypertensive Medications Protocol Failed - 10/5/2023  2:07 AM        Failed - CMP or BMP in past 6 months     No results found for this or any previous visit (from the past 4392 hour(s)).             Failed - In person appointment or virtual visit in the past 6 months     Recent Outpatient Visits              7 months ago     [address], [place], [place]    Office Visit    8 months ago     [address], [place], [place]    Office Visit    8 months ago     [address], [place], [place]    Office Visit    8 months ago     [address], [place], [place]    Office Visit    8 months ago Chronic bilateral low back pain without sciatica    [place], [place]    Office Visit                      Passed - In person appointment in the past 12 or next 3 months     Recent Outpatient Visits              7 months ago     [address] [place], [place]    Office Visit    8 months ago     [address], [place], [place]    Office Visit    8 months ago     [address] [place], [place]    Office Visit    8 months ago     [address] [place], [place]    Office Visit    8 months ago Chronic bilateral low back pain without sciatica    [place], [place]    Office Visit                      Passed - Last BP reading less than 140/90     BP Readings from Last 1 Encounters:  01/05/23 : 137/80              Passed - EGFRCR or GFRNAA > 50     GFR Evaluation  EGFRCR: 89 , resulted on 1/5/2023

## 2023-10-26 ENCOUNTER — LAB ENCOUNTER (OUTPATIENT)
Dept: LAB | Age: 58
End: 2023-10-26
Attending: FAMILY MEDICINE

## 2023-10-26 ENCOUNTER — OFFICE VISIT (OUTPATIENT)
Dept: FAMILY MEDICINE CLINIC | Facility: CLINIC | Age: 58
End: 2023-10-26

## 2023-10-26 VITALS
DIASTOLIC BLOOD PRESSURE: 80 MMHG | WEIGHT: 188.5 LBS | BODY MASS INDEX: 28.57 KG/M2 | SYSTOLIC BLOOD PRESSURE: 138 MMHG | HEIGHT: 68 IN | HEART RATE: 79 BPM | TEMPERATURE: 98 F

## 2023-10-26 DIAGNOSIS — I10 ESSENTIAL HYPERTENSION: ICD-10-CM

## 2023-10-26 DIAGNOSIS — R76.8 ANA POSITIVE: ICD-10-CM

## 2023-10-26 DIAGNOSIS — M79.642 BILATERAL HAND PAIN: ICD-10-CM

## 2023-10-26 DIAGNOSIS — M25.649 MORNING JOINT STIFFNESS OF HAND, UNSPECIFIED LATERALITY: ICD-10-CM

## 2023-10-26 DIAGNOSIS — M79.18 RHOMBOID MUSCLE PAIN: ICD-10-CM

## 2023-10-26 DIAGNOSIS — G47.8 UNREFRESHED BY SLEEP: ICD-10-CM

## 2023-10-26 DIAGNOSIS — M79.641 BILATERAL HAND PAIN: ICD-10-CM

## 2023-10-26 DIAGNOSIS — R53.83 LETHARGY: Primary | ICD-10-CM

## 2023-10-26 DIAGNOSIS — K64.4 EXTERNAL HEMORRHOIDS: ICD-10-CM

## 2023-10-26 LAB
CRP SERPL-MCNC: <0.29 MG/DL (ref ?–0.3)
ERYTHROCYTE [SEDIMENTATION RATE] IN BLOOD: 23 MM/HR
RHEUMATOID FACT SERPL-ACNC: <10 IU/ML (ref ?–15)

## 2023-10-26 PROCEDURE — 86140 C-REACTIVE PROTEIN: CPT

## 2023-10-26 PROCEDURE — 3008F BODY MASS INDEX DOCD: CPT | Performed by: FAMILY MEDICINE

## 2023-10-26 PROCEDURE — 86038 ANTINUCLEAR ANTIBODIES: CPT

## 2023-10-26 PROCEDURE — 36415 COLL VENOUS BLD VENIPUNCTURE: CPT

## 2023-10-26 PROCEDURE — 3075F SYST BP GE 130 - 139MM HG: CPT | Performed by: FAMILY MEDICINE

## 2023-10-26 PROCEDURE — 86225 DNA ANTIBODY NATIVE: CPT

## 2023-10-26 PROCEDURE — 86431 RHEUMATOID FACTOR QUANT: CPT

## 2023-10-26 PROCEDURE — 3079F DIAST BP 80-89 MM HG: CPT | Performed by: FAMILY MEDICINE

## 2023-10-26 PROCEDURE — 99215 OFFICE O/P EST HI 40 MIN: CPT | Performed by: FAMILY MEDICINE

## 2023-10-26 PROCEDURE — 85652 RBC SED RATE AUTOMATED: CPT

## 2023-10-26 RX ORDER — CELECOXIB 200 MG/1
200 CAPSULE ORAL DAILY
Qty: 90 CAPSULE | Refills: 0 | Status: SHIPPED | OUTPATIENT
Start: 2023-10-26

## 2023-10-27 LAB
DSDNA IGG SERPL IA-ACNC: 0.7 IU/ML
ENA AB SER QL IA: 0.2 UG/L
ENA AB SER QL IA: NEGATIVE

## 2023-12-28 DIAGNOSIS — K21.9 GASTROESOPHAGEAL REFLUX DISEASE: ICD-10-CM

## 2023-12-29 RX ORDER — PANTOPRAZOLE SODIUM 40 MG/1
TABLET, DELAYED RELEASE ORAL
Qty: 90 TABLET | Refills: 3 | Status: SHIPPED | OUTPATIENT
Start: 2023-12-29

## 2023-12-29 NOTE — TELEPHONE ENCOUNTER
Refill passed per [facility] protocol.   Requested Prescriptions   Pending Prescriptions Disp Refills    pantoprazole 40 MG Oral Tab EC 90 tablet 1     Sig: TAKE 1 TABLET BY MOUTH EVERY DAY BEFORE EATING DINNER       Gastrointestional Medication Protocol Passed - 12/28/2023 10:16 PM        Passed - In person appointment or virtual visit in the past 12 mos or appointment in next 3 mos     Recent Outpatient Visits              2 months ago [address], [address], [facility]    Office Visit    10 months ago     [place], [place]    Office Visit    10 months ago     [place], [place]    Office Visit    11 months ago     [place], [place]    Office Visit    11 months ago     [place], [place]    Office Visit                         Recent Outpatient Visits              2 months ago [address], [address], [facility]    Office Visit    10 months ago     [place], [place]    Office Visit    10 months ago     [place], [place]    Office Visit    11 months ago     [place], [place]    Office Visit    11 months ago     [place], [place]    Office Visit

## 2024-01-05 DIAGNOSIS — I10 ESSENTIAL HYPERTENSION: ICD-10-CM

## 2024-01-07 NOTE — TELEPHONE ENCOUNTER
Failed Protocol/No Protocol.    Requested Prescriptions   Pending Prescriptions Disp Refills    LISINOPRIL 10 MG Oral Tab [Pharmacy Med Name: LISINOPRIL 10 MG TABLET] 90 tablet 0     Sig: TAKE 1 TABLET BY MOUTH EVERY DAY       Hypertensive Medications Protocol Failed - 1/5/2024 12:34 AM        Failed - CMP or BMP in past 6 months     No results found for this or any previous visit (from the past 4392 hour(s)).            Passed - In person appointment in the past 12 or next 3 months     [facility]its              [facility]    11 months ago     [facility] Mehnaz Malcolm, PT    Office Visit    11 months ago     [facility] Mehnaz Malcolm, PT    Office Visit    11 months ago     [facility]    Office Visit    11 months ago     [facility]    Office Visit                      Passed - Last BP reading less than 140/90     BP Readings from Last 1 Encounters:   10/26/23 138/80               Passed - In person appointment or virtual visit in the past 6 months     Recent Outpatient Visits              [facility]    [facility]    11 months ago     [facility] Mehnaz Malcolm, PT    Office Visit    11 months ago     [facility] Mehnaz Malcolm, PT    Office Visit    11 months ago     [facility] Mehnaz Malcolm, PT    Office Visit    11 months ago     [facility] Mehnaz Malcolm, PT    Office Visit                      Passed - EGFRCR or GFRNAA > 50     GFR Evaluation                   Recent Outpatient Visits              2 months ago [facility] Frank Duarte, DO    Office Visit    11  months ago     [facility] Mehnaz Malcolm, PT    Office Visit    11 months ago     [facility] Mehnaz Malcolm, PT    Office Visit    11 months ago     [facility] Mehnaz Malcolm, PT    Office Visit    11 months ago     [facility] Mehnaz Malcolm, PT    Office Visit

## 2024-01-09 RX ORDER — LISINOPRIL 10 MG/1
10 TABLET ORAL DAILY
Qty: 90 TABLET | Refills: 0 | Status: SHIPPED | OUTPATIENT
Start: 2024-01-09

## 2024-01-22 ENCOUNTER — TELEPHONE (OUTPATIENT)
Dept: INTERNAL MEDICINE CLINIC | Facility: HOSPITAL | Age: 59
End: 2024-01-22

## 2024-01-22 DIAGNOSIS — M79.18 RHOMBOID MUSCLE PAIN: ICD-10-CM

## 2024-01-22 DIAGNOSIS — M25.649 MORNING JOINT STIFFNESS OF HAND, UNSPECIFIED LATERALITY: ICD-10-CM

## 2024-01-22 DIAGNOSIS — M79.642 BILATERAL HAND PAIN: ICD-10-CM

## 2024-01-22 DIAGNOSIS — M79.641 BILATERAL HAND PAIN: ICD-10-CM

## 2024-01-22 NOTE — TELEPHONE ENCOUNTER
Outside Covid Testing done    Results and RTW guidelines:    COVID RESULT reported:      Test type:    [] Rapid outside         [] PCR outside       [x] Home Test    Date of test: 1/21     Test location: home         [] Result viewed in Epic with verbal consent received from employee     [x] Results per Employee Covid Dashboard    [] Employee instructed to email copy of result to [email or web address]       [x] Discussed with employee     [] Unable to reach by phone.  Sent via iodine message        [x] Positive    - Employee should quarantine at home for at least 5 days (day 1 is day after sx onset) , follow the CDC guidelines for cleaning and                              quarantining; see CDC.gov   -This employee may RTW on day 6 if asymptomatic or mildly symptomatic (with improving symptoms).  Call Employee Health on day 5 if unable to return on                      day 6 after symptom onset.    -This employee needs to call Employee Health on day 5 after symptom onset.  The employee needs to be cleared by Employee Health.  - If Employee is still experiencing severe symptoms must make a RTW appt with Employee Health, Employee will not be cleared if:    1. Has consistent cough, shortness of breath or fatigue that restricts your physical activities    2. Is still feeling \"unwell\"    3. Within 15 days of hospitalization for COVID    4. Within 20 days of intubation for COVID    5. Still has a fever, vomiting or diarrhea   - Keep communication open with management about RTW and if symptoms worsen    - Monitor sx and temperature    - Employee should quarantine at home for at least 5 days from sx onset, follow the CDC guidelines for cleaning and quarantining; see CDC.gov                  - Notify PCP of result                 - Seek emergent care with worsening symptoms        Notes:     RTW PLAN:    [x]  If COVID positive results, off work minimum of 5 days from positive test or onset of symptoms (day 0)        On  day 5, if asymptomatic or mildly symptomatic (with improving symptoms) may return to work day 6          On day 5, if symptomatic, call Employee Health for RTW screening        []  COVID positive result - call Employee Health on day 5 after symptom onset.  The employee needs to be cleared by Employee Health to RTW.  [] RTW immediately, continue to monitor for sx  [] RTW when sx improve; must be fever free for 24 hours w/o medications, Diarrhea/Vomiting free for 24 hours w/o medications  [] [facility] ordered; continue to monitor sx and call for new/worsening sx.  Discuss RTW guidelines with manager                                                                                                                                                                        [] Rapid ordered to confirm home negative.   [] May continue to work  [] Follow up with PCP  [] Home until further instruction from hotline with [facility] results  INSTRUCTIONS PROVIDED:  [x]  Plan as noted above  []  Length of time to obtain results  []  May return to work if views negative in My Chart and  remains fever, vomiting, and diarrhea free  []  May continue to work if remains asymptomatic   [x]  Quarantine instructions  [x]  Masking protocol  [x]  S/S of worsening infection/condition and importance of prompt medical re-evaluation including when to seek emergency care  [] If symptoms develop, stay home and call hotline for rapid test order    Estimated RTW date:  1/27  [x] Manager notified        [] GEORGE  []FROY   [x] [facility]  Manager : Dereck Dc    HAVE YOU RECEIVED THE COVID-19 Vaccine? Yes [x]    No []          If yes, date(s) received:    1/4/21; 2/1/21; 12/23/21        Which vaccine:  Pfizer []     Moderna [x]    J&J []      SYMPTOMS (reported via dashboard):  [] asymptomatic  [x] symptomatic  [] GI symptoms only    Symptom onset date: 1/21    Fever   > 100F             Yes []      Cough                          Yes []      Shortness of breath  Yes [x]       Congestion                 Yes [x]      Runny nose                Yes [x]        Loss of Smell              Yes []        Loss of Taste             Yes []       Sore throat                 Yes [x]       Fatigue                        Yes [x]       Body Aches                Yes [x]        Chills                           Yes []        Headache                   Yes [x]             GI symptoms             Yes [x]     No []                     Nausea   []          Vomiting            []                                    Diarrhea  []          Upset stomach [x]      Employee has positive COVID Exposure?  Yes []     No [x]    Date of exposure:     []  Coworker                       [] patient                        [] Family/friend    Employee has a history of Covid?  Yes [x]     No []   If Yes, when: 2022    When was the last shift you worked?: 1/21    Notes:

## 2024-01-23 RX ORDER — CELECOXIB 200 MG/1
200 CAPSULE ORAL DAILY
Qty: 90 CAPSULE | Refills: 1 | Status: SHIPPED | OUTPATIENT
Start: 2024-01-23

## 2024-01-23 NOTE — TELEPHONE ENCOUNTER
Refill passed per [facility] protocol.    Requested Prescriptions   Pending Prescriptions Disp Refills    CELECOXIB 200 MG Oral Cap [Pharmacy Med Name: CELECOXIB 200 MG CAPSULE] 90 capsule 0     Sig: Take 1 capsule (200 mg total) by mouth daily. Take with food. This is for pain and inflammation.       Non-Narcotic Pain Medication Protocol Passed - 1/22/2024 12:31 AM        Passed - In person appointment or virtual visit in the past 6 mos or appointment in next 3 mos     Recent Outpatient Visits              2 months ago [facility] Frank Verde, DO    Office Visit    11 months ago     [facility] Mehnaz Malcolm, PT    Office Visit    11 months ago     [facility] Mehnaz Malcolm, PT    Office Visit    12 months ago     [facility]    [facility] Visit    1 year ago     [facility] Mehnaz Malcolm, PT    Office Visit                           Recent Outpatient Visits              [facility]    11 months ago     [facility] Mehnaz Malcolm, PT    Office Visit    11 months ago     [facility] Mehnaz Malcolm, PT    Office Visit    12 months ago     [facility] Mehnaz Malcolm, PT    Office Visit    1 year ago     [facility] Mehnaz Malcolm, PT    Office Visit

## 2024-03-25 DIAGNOSIS — E78.2 MIXED HYPERLIPIDEMIA: ICD-10-CM

## 2024-03-26 NOTE — TELEPHONE ENCOUNTER
Please review; protocol failed/No Protocol    Sent MyChart message to patient to schedule an annual exam.     No Active/Future labs pended     Requested Prescriptions   Pending Prescriptions Disp Refills    ATORVASTATIN 20 MG Oral Tab [Pharmacy Med Name: ATORVASTATIN 20 MG TABLET] 90 tablet 3     Sig: TAKE 1 TABLET BY MOUTH EVERY DAY IN THE EVENING       Cholesterol Medication Protocol Failed - 3/25/2024  1:08 AM        Failed - ALT < 80     Lab Results   Component Value Date    ALT 42 01/05/2023             Failed - ALT resulted within past year        Failed - Lipid panel within past 12 months     Lab Results   Component Value Date    CHOLEST 153 01/05/2023    TRIG 149 01/05/2023    HDL 38 (L) 01/05/2023    LDL 89 01/05/2023    VLDL 24 01/05/2023    NONHDLC 115 01/05/2023             Passed - In person appointment or virtual visit in the past 12 mos or appointment in next 3 mos     Recent Outpatient Visits              5 months ago [facility] Frank Verde, DO    Office Visit    1 year ago     [facility] Mehnaz Malcolm, PT    Office Visit    1 year ago     [facility] Mehnaz Malcolm, PT    Office Visit    1 year ago     [facility] Mehnaz Malcolm, PT    Office Visit    1 year ago     [facility] Mehnaz Malcolm, PT    Office Visit                           Recent Outpatient Visits              5 months ago [facility], [facility] Frank Verde, DO    Office Visit    1 year ago     [facility] Mehnaz Malcolm, PT    Office Visit    1 year ago     [facility] Mehnaz Malcolm, PT    Office Visit    1 year ago     [facility] Mehnaz Malcolm, PT    Office Visit    1 year ago     [facility] Mehnaz Malcolm, PT    Office Visit

## 2024-03-27 RX ORDER — ATORVASTATIN CALCIUM 20 MG/1
20 TABLET, FILM COATED ORAL EVERY EVENING
Qty: 90 TABLET | Refills: 0 | Status: SHIPPED | OUTPATIENT
Start: 2024-03-27 | End: 2024-06-26

## 2024-04-07 DIAGNOSIS — I10 ESSENTIAL HYPERTENSION: ICD-10-CM

## 2024-04-09 RX ORDER — LISINOPRIL 10 MG/1
10 TABLET ORAL DAILY
Qty: 90 TABLET | Refills: 0 | Status: SHIPPED | OUTPATIENT
Start: 2024-04-09

## 2024-04-09 NOTE — TELEPHONE ENCOUNTER
MAIRA Hdz (pod mate/ on behalf of Dr Verde )     Please review; protocol failed/no protocol.     Requested Prescriptions   Pending Prescriptions Disp Refills    LISINOPRIL 10 MG Oral Tab [Pharmacy Med Name: LISINOPRIL 10 MG TABLET] 90 tablet 0     Sig: TAKE 1 TABLET BY MOUTH EVERY DAY       Hypertension Medications Protocol Failed - 4/7/2024  8:28 AM        Failed - CMP or BMP in past 12 months        Failed - EGFRCR or GFRNAA > 50     GFR Evaluation            Passed - Last BP reading less than 140/90     BP Readings from Last 1 Encounters:   10/26/23 138/80               Passed - In person appointment or virtual visit in the past 12 mos or appointment in next 3 mos     [facility]              [facility]    1 year ago     [facility] Mehnaz Malcolm, PT    Office Visit    1 year ago     [facility] Mehnaz Malcolm, PT    Office Visit    1 year ago     [facility] Mehnaz Malcolm, PT    Office Visit    1 year ago     [facility] Mehnaz Malcolm, PT    Office Visit                            Recent Outpatient Visits              5 months ago [facility], Frank Duarte DO    Office Visit    1 year ago     [facility] Mehnaz Malcolm, PT    Office Visit    1 year ago     [facility] Mehnaz Malcolm, PT    Office Visit    1 year ago     [facility] Mehnaz Malcolm, PT    Office Visit    1 year ago     [facility] Mehnaz Malcolm, PT    Office Visit

## 2024-06-24 DIAGNOSIS — E78.2 MIXED HYPERLIPIDEMIA: ICD-10-CM

## 2024-06-26 RX ORDER — ATORVASTATIN CALCIUM 20 MG/1
20 TABLET, FILM COATED ORAL EVERY EVENING
Qty: 90 TABLET | Refills: 3 | Status: SHIPPED | OUTPATIENT
Start: 2024-06-26

## 2024-06-26 NOTE — TELEPHONE ENCOUNTER
Please Review. Protocol Failed; No Protocol   Lab Results   Component Value Date     ALT 42 01/05/2023   Recent Visits  Date Type Provider Dept   10/26/23 Office Visit Frank Verde DO [facility]   01/05/23 [facility]       Requested Prescriptions   Pending Prescriptions Disp Refills    ATORVASTATIN 20 MG Oral Tab [Pharmacy Med Name: ATORVASTATIN 20 MG TABLET] 90 tablet 0     Sig: Take 1 tablet (20 mg total) by mouth every evening. No refills until seen for physical  and labs       Cholesterol Medication Protocol Failed - 6/24/2024  7:31 AM        Failed - ALT < 80     Lab Results   Component Value Date    ALT 42 01/05/2023             Failed - ALT resulted within past year        Failed - Lipid panel within past 12 months     Lab Results   Component Value Date    CHOLEST 153 01/05/2023    TRIG 149 01/05/2023    HDL 38 (L) 01/05/2023    LDL 89 01/05/2023    VLDL 24 01/05/2023    NONHDLC 115 01/05/2023             Passed - In person appointment or virtual visit in the past 12 mos or appointment in next 3 mos     Recent Outpatient Visits              8 months ago [facility] Frank Verde DO    Office Visit    1 year ago     [facility] Mehnaz Malcolm, PT    Office Visit    1 year ago     [facility] Mehnaz Malcolm, PT    Office Visit    1 year ago     [facility] Mehnaz Malcolm, PT    Office Visit    1 year ago     [facility] Mehnaz Malcolm, PT    Office Visit          Future Appointments         Provider Department Appt Notes    In 2 weeks Simone Ramsay MD [facility] physical exam                           Future Appointments         Provider Department Appt Notes    In 2 weeks Simone Ramsay MD [facility] physical exam          Recent  Outpatient Visits              8 months ago [facility], [facility] Frank Verde,     Office Visit    1 year ago     [facility] Mehnaz Malcolm, PT    Office Visit    1 year ago     [facility] Mehnaz Malcolm, PT    Office Visit    1 year ago     [facility] Mehnaz Malcolm, PT    Office Visit    1 year ago     [facility] Mehnaz Malcolm, PT    Office Visit

## 2024-07-07 DIAGNOSIS — I10 ESSENTIAL HYPERTENSION: ICD-10-CM

## 2024-07-10 NOTE — TELEPHONE ENCOUNTER
Please review. Protocol Failed; No Protocol    Requested Prescriptions   Pending Prescriptions Disp Refills    LISINOPRIL 10 MG Oral Tab [Pharmacy Med Name: LISINOPRIL 10 MG TABLET] 90 tablet 0     Sig: Take 1 tablet (10 mg total) by mouth daily. No refills until seen in office for physical  and labs       Hypertension Medications Protocol Failed - 7/7/2024  7:29 AM        Failed - CMP or BMP in past 12 months        Failed - EGFRCR or GFRNAA > 50     GFR Evaluation            Passed - Last BP reading less than 140/90     BP Readings from Last 1 Encounters:   10/26/23 138/80               Passed - In person appointment or virtual visit in the past 12 mos or appointment in next 3 mos     Recent Outpatient Visits              8 months [facility]     [facility]    1 year ago     [facility] Mehnaz Malcolm, PT    Office Visit    1 year ago     [facility] Mehnaz Malcolm, PT    Office Visit    1 year ago     [facility] Mehnaz Malcolm, PT    Office Visit    1 year ago     [facility] Mehnaz Malcolm, PT    Office Visit          Future Appointments         Provider Department Appt Notes    Tomorrow Simone Ramsay MD [facility] physical exam                           Future Appointments         Provider Department Appt Notes    Tomorrow Simone Ramsay MD [facility] physical exam          Recent Outpatient Visits              8 months ago [facility], Frank Duarte,     Office Visit    1 year ago     [facility] Mehnaz Malcolm, PT    Office Visit    1 year ago     [facility] Mehnaz Malcolm, PT    Office Visit    1 year ago     [facility] Mehnaz Malcolm, PT     Office Visit    1 year ago     [facility] Mehnaz Malcolm, PT    Office Visit

## 2024-07-11 ENCOUNTER — OFFICE VISIT (OUTPATIENT)
Dept: INTERNAL MEDICINE CLINIC | Facility: CLINIC | Age: 59
End: 2024-07-11
Payer: COMMERCIAL

## 2024-07-11 ENCOUNTER — LAB ENCOUNTER (OUTPATIENT)
Dept: LAB | Facility: REFERENCE LAB | Age: 59
End: 2024-07-11
Attending: INTERNAL MEDICINE
Payer: COMMERCIAL

## 2024-07-11 VITALS
DIASTOLIC BLOOD PRESSURE: 78 MMHG | SYSTOLIC BLOOD PRESSURE: 140 MMHG | BODY MASS INDEX: 29.98 KG/M2 | HEIGHT: 67 IN | OXYGEN SATURATION: 96 % | WEIGHT: 191 LBS | HEART RATE: 72 BPM | TEMPERATURE: 97 F

## 2024-07-11 DIAGNOSIS — Z12.5 ENCOUNTER FOR SCREENING FOR MALIGNANT NEOPLASM OF PROSTATE: ICD-10-CM

## 2024-07-11 DIAGNOSIS — E55.9 VITAMIN D DEFICIENCY: ICD-10-CM

## 2024-07-11 DIAGNOSIS — Z00.00 ANNUAL PHYSICAL EXAM: Primary | ICD-10-CM

## 2024-07-11 DIAGNOSIS — Z00.00 ANNUAL PHYSICAL EXAM: ICD-10-CM

## 2024-07-11 DIAGNOSIS — R53.83 OTHER FATIGUE: ICD-10-CM

## 2024-07-11 DIAGNOSIS — E78.2 MIXED HYPERLIPIDEMIA: ICD-10-CM

## 2024-07-11 DIAGNOSIS — Z91.89 CARDIOVASCULAR RISK FACTOR: ICD-10-CM

## 2024-07-11 DIAGNOSIS — Z11.59 NEED FOR HEPATITIS C SCREENING TEST: ICD-10-CM

## 2024-07-11 DIAGNOSIS — Z12.11 ENCOUNTER FOR SCREENING COLONOSCOPY: ICD-10-CM

## 2024-07-11 DIAGNOSIS — R73.03 PREDIABETES: ICD-10-CM

## 2024-07-11 LAB
ALBUMIN SERPL-MCNC: 4.5 G/DL (ref 3.2–4.8)
ALBUMIN/GLOB SERPL: 1.4 {RATIO} (ref 1–2)
ALP LIVER SERPL-CCNC: 70 U/L
ALT SERPL-CCNC: 35 U/L
ANION GAP SERPL CALC-SCNC: 8 MMOL/L (ref 0–18)
AST SERPL-CCNC: 23 U/L (ref ?–34)
BASOPHILS # BLD AUTO: 0.06 X10(3) UL (ref 0–0.2)
BASOPHILS NFR BLD AUTO: 1.1 %
BILIRUB SERPL-MCNC: 0.9 MG/DL (ref 0.2–1.1)
BUN BLD-MCNC: 17 MG/DL (ref 9–23)
BUN/CREAT SERPL: 16.5 (ref 10–20)
CALCIUM BLD-MCNC: 9.6 MG/DL (ref 8.7–10.4)
CHLORIDE SERPL-SCNC: 109 MMOL/L (ref 98–112)
CHOLEST SERPL-MCNC: 164 MG/DL (ref ?–200)
CO2 SERPL-SCNC: 24 MMOL/L (ref 21–32)
COMPLEXED PSA SERPL-MCNC: 0.44 NG/ML (ref ?–4)
CREAT BLD-MCNC: 1.03 MG/DL
DEPRECATED RDW RBC AUTO: 38.9 FL (ref 35.1–46.3)
EGFRCR SERPLBLD CKD-EPI 2021: 83 ML/MIN/1.73M2 (ref 60–?)
EOSINOPHIL # BLD AUTO: 0.14 X10(3) UL (ref 0–0.7)
EOSINOPHIL NFR BLD AUTO: 2.6 %
ERYTHROCYTE [DISTWIDTH] IN BLOOD BY AUTOMATED COUNT: 12.6 % (ref 11–15)
EST. AVERAGE GLUCOSE BLD GHB EST-MCNC: 126 MG/DL (ref 68–126)
FASTING PATIENT LIPID ANSWER: YES
FASTING STATUS PATIENT QL REPORTED: YES
GLOBULIN PLAS-MCNC: 3.3 G/DL (ref 2–3.5)
GLUCOSE BLD-MCNC: 124 MG/DL (ref 70–99)
HBA1C MFR BLD: 6 % (ref ?–5.7)
HCT VFR BLD AUTO: 46.3 %
HCV AB SERPL QL IA: NONREACTIVE
HDLC SERPL-MCNC: 38 MG/DL (ref 40–59)
HGB BLD-MCNC: 16 G/DL
IMM GRANULOCYTES # BLD AUTO: 0.01 X10(3) UL (ref 0–1)
IMM GRANULOCYTES NFR BLD: 0.2 %
LDLC SERPL CALC-MCNC: 103 MG/DL (ref ?–100)
LYMPHOCYTES # BLD AUTO: 1.18 X10(3) UL (ref 1–4)
LYMPHOCYTES NFR BLD AUTO: 21.7 %
MCH RBC QN AUTO: 29 PG (ref 26–34)
MCHC RBC AUTO-ENTMCNC: 34.6 G/DL (ref 31–37)
MCV RBC AUTO: 83.9 FL
MONOCYTES # BLD AUTO: 0.43 X10(3) UL (ref 0.1–1)
MONOCYTES NFR BLD AUTO: 7.9 %
NEUTROPHILS # BLD AUTO: 3.62 X10 (3) UL (ref 1.5–7.7)
NEUTROPHILS # BLD AUTO: 3.62 X10(3) UL (ref 1.5–7.7)
NEUTROPHILS NFR BLD AUTO: 66.5 %
NONHDLC SERPL-MCNC: 126 MG/DL (ref ?–130)
OSMOLALITY SERPL CALC.SUM OF ELEC: 295 MOSM/KG (ref 275–295)
PLATELET # BLD AUTO: 173 10(3)UL (ref 150–450)
POTASSIUM SERPL-SCNC: 3.8 MMOL/L (ref 3.5–5.1)
PROT SERPL-MCNC: 7.8 G/DL (ref 5.7–8.2)
RBC # BLD AUTO: 5.52 X10(6)UL
SODIUM SERPL-SCNC: 141 MMOL/L (ref 136–145)
TRIGL SERPL-MCNC: 125 MG/DL (ref 30–149)
TSI SER-ACNC: 2.91 MIU/ML (ref 0.55–4.78)
VLDLC SERPL CALC-MCNC: 21 MG/DL (ref 0–30)
WBC # BLD AUTO: 5.4 X10(3) UL (ref 4–11)

## 2024-07-11 PROCEDURE — 83036 HEMOGLOBIN GLYCOSYLATED A1C: CPT | Performed by: INTERNAL MEDICINE

## 2024-07-11 PROCEDURE — 80061 LIPID PANEL: CPT | Performed by: INTERNAL MEDICINE

## 2024-07-11 PROCEDURE — 85025 COMPLETE CBC W/AUTO DIFF WBC: CPT | Performed by: INTERNAL MEDICINE

## 2024-07-11 PROCEDURE — 84443 ASSAY THYROID STIM HORMONE: CPT

## 2024-07-11 PROCEDURE — 36415 COLL VENOUS BLD VENIPUNCTURE: CPT

## 2024-07-11 PROCEDURE — 86803 HEPATITIS C AB TEST: CPT | Performed by: INTERNAL MEDICINE

## 2024-07-11 PROCEDURE — 80053 COMPREHEN METABOLIC PANEL: CPT

## 2024-07-11 RX ORDER — LISINOPRIL 10 MG/1
10 TABLET ORAL DAILY
Qty: 90 TABLET | Refills: 3 | OUTPATIENT
Start: 2024-07-11

## 2024-07-11 NOTE — PROGRESS NOTES
[facility]  New Patient History and Physical      HPI:     Chief Complaint   Patient presents with    Physical     Physical        Jose Workman is a 59 year old male presenting for:  .  Has  has a past medical history of Esophageal reflux, Essential hypertension, Hypercholesterolemia, and Prediabetes.    HLD on atorvastatin.      HTN on lisinopril.      GERD on Pantoprazole as needed.        Labs:   Complete Metabolic Panel:  Lab Results   Component Value Date/Time     01/05/2023 10:54 AM    K 3.8 01/05/2023 10:54 AM     01/05/2023 10:54 AM    CO2 25.0 01/05/2023 10:54 AM    CREATSERUM 0.98 01/05/2023 10:54 AM    CA 9.2 01/05/2023 10:54 AM     (H) 01/05/2023 10:54 AM    TP 7.9 01/05/2023 10:54 AM    ALB 3.9 01/05/2023 10:54 AM    ALKPHO 69 01/05/2023 10:54 AM    AST 24 01/05/2023 10:54 AM    ALT 42 01/05/2023 10:54 AM    BILT 1.0 01/05/2023 10:54 AM    TSH 2.420 01/05/2023 10:54 AM    T4F 1.0 10/03/2021 09:43 AM        CBC:  @LABRCNTIP(RBC:3,HGB:3,HCT:3,MCV:3,MCH:3,MCHC:3,RDW:3,NEPRELIM:3,WBC:3,PLT:3)@       Hemoglobin A1C, Microalbumin  Lab Results   Component Value Date/Time    A1C 6.0 (H) 01/05/2023 10:54 AM        Lipid panel  Lab Results   Component Value Date/Time    CHOLEST 153 01/05/2023 10:54 AM    HDL 38 (L) 01/05/2023 10:54 AM    TRIG 149 01/05/2023 10:54 AM    LDL 89 01/05/2023 10:54 AM    NONHDLC 115 01/05/2023 10:54 AM     The 10-year ASCVD risk score (Robert CLARK, et al., 2019) is: 11.2%    Values used to calculate the score:      Age: 59 years      Sex: Male      Is Non- : No      Diabetic: No      Tobacco smoker: No      Systolic Blood Pressure: 140 mmHg      Is BP treated: Yes      HDL Cholesterol: 38 mg/dL      Total Cholesterol: 153 mg/dL       Medications:  Current Outpatient Medications   Medication Sig Dispense Refill    pantoprazole 40 MG Oral Tab EC TAKE 1 TABLET BY MOUTH EVERY DAY BEFORE EATING DINNER 90 tablet 3     cyclobenzaprine 10 MG Oral Tab Take 1 tablet (10 mg total) by mouth nightly as needed for Muscle spasms (for muscle relaxation). **Can make tired. 90 tablet 0    atorvastatin 20 MG Oral Tab Take 1 tablet (20 mg total) by mouth every evening. 90 tablet 3    lisinopril 10 MG Oral Tab Take 1 tablet (10 mg total) by mouth daily. No refills until seen in office for physical  and labs 90 tablet 0    celecoxib 200 MG Oral Cap Take 1 capsule (200 mg total) by mouth daily. Take with food. This is for pain and inflammation. 90 capsule 1    hydrocortisone-pramoxine perianal 1-1 % External Foam Place 1 applicator rectally 2 (two) times daily. For anal pain and hemorrhoids (Patient not taking: Reported on 2024) 10 g 1    ERGOCALCIFEROL 1.25 MG (57682 UT) Oral Cap TAKE 1 CAPSULE BY MOUTH ONE TIME PER WEEK (Patient not taking: Reported on 10/26/2023) 12 capsule 0    Multiple Vitamin (MULTI-VITAMIN DAILY) Oral Tab Take by mouth. (Patient not taking: Reported on 10/26/2023)        PMH:  Past Medical History:    Esophageal reflux    I take pantoprazole/only if I need it    Essential hypertension    I take Lisinopril 10mg (1 daily)    Hypercholesterolemia         PSH:  Past Surgical History:   Procedure Laterality Date    Colonoscopy  2014    Electrocardiogram, complete  10/10/2013    Scanned to Media Tab    Hemorrhoidectomy         Allergies:  No Known Allergies   Social History:  Social History     Socioeconomic History    Marital status:    Tobacco Use    Smoking status: Former     Current packs/day: 0.00     Types: Cigarettes     Start date: 2016     Quit date: 2016     Years since quittin.1     Passive exposure: Past    Smokeless tobacco: Never   Vaping Use    Vaping status: Never Used   Substance and Sexual Activity    Alcohol use: No    Drug use: No   Other Topics Concern    Caffeine Concern No    Stress Concern No    Weight Concern No    Special Diet No    Exercise No    Seat Belt No        Family  History:  Family History   Problem Relation Age of Onset    Diabetes Maternal Uncle     Stroke Mother         CVA    Psychiatric Brother     Psychiatric Brother           REVIEW OF SYSTEMS:   Review of Systems         PHYSICAL EXAM:   /78   Pulse 72   Temp 97 °F (36.1 °C)   Ht 5' 7\" (1.702 m)   Wt 191 lb (86.6 kg)   SpO2 96%   BMI 29.91 kg/m²  Estimated body mass index is 29.91 kg/m² as calculated from the following:    Height as of this encounter: 5' 7\" (1.702 m).    Weight as of this encounter: 191 lb (86.6 kg).     Wt Readings from Last 3 Encounters:   07/11/24 191 lb (86.6 kg)   10/26/23 188 lb 8 oz (85.5 kg)   01/05/23 192 lb (87.1 kg)       Physical Exam        ASSESSMENT AND PLAN:   Patient is a 59 year old male who presents primarily presents for:      ICD-10-CM    1. Mixed hyperlipidemia  E78.2       2. Vitamin D deficiency  E55.9                 Health Maintenance:  Health Maintenance   Topic Date Due    COVID-19 Vaccine (5 - 2023-24 season) 09/01/2023    Annual Depression Screening  01/01/2024    Annual Physical  01/05/2024    Colorectal Cancer Screening  12/18/2024    Influenza Vaccine (1) 10/01/2024    PSA  01/05/2025    DTaP,Tdap,and Td Vaccines (2 - Td or Tdap) 03/31/2026    Zoster Vaccines  Completed    Pneumococcal Vaccine: Birth to 64yrs  Aged Out   ***          Meds & Refills for this Visit:  Requested Prescriptions      No prescriptions requested or ordered in this encounter       No orders of the defined types were placed in this encounter.      Imaging & Consults:  None        Simone Ramsay MD     No follow-ups on file.  Important issues to follow up on next visit***      Patient indicates understanding of the above recommendations and agrees to the above plan.  Reasurrance and education provided. All questions answered.  Notified to call with any questions, complications, allergies, or worsening or changing symptoms as well as any side effects or complications from the  treatments .  Red flags/ ER precautions discussed.    This note was produced using voice recognition software.  As a result, errors may occur.  When identified, these errors have been corrected.  While every attempt is made to correct errors during dictation, errors may still exist.    Total time spent was *** minutes which includes: Preparation to see patient including chart review, reviewing appropriate medical history, counseling and education (diet and exercise), discussing treatment options, ordering appropriate diagnostic tests and documentation.    Code      Typical time                                              Established Patient   57009          10  min                                                     07599    5 min   87330         15                                                            61184    10 min   51972          30                                                           39180    20 min   57082          45                                                             59913  30 min   62837          60                                                             92909    40 min       Simone Ramsay MD  Internal Medicine/Primary Care  [facility]

## 2024-07-12 NOTE — TELEPHONE ENCOUNTER
Looks like patient is now seeing an internal medicine physician.  He just saw them today and already has a follow-up appointment in January of next year.  They should be taking care of the refills from now on.

## 2024-07-22 NOTE — PROGRESS NOTES
[facility] part of [facility] Patient History and Physical      HPI:     Chief Complaint   Patient presents with    Physical     Physical        Jose Workman is a 59 year old male presenting for:  Establishment of care.  Has  has a past medical history of Esophageal reflux, Essential hypertension, Hypercholesterolemia, and Prediabetes.      HLD on atorvastatin.      HTN on lisinopril.      GERD on Pantoprazole as needed.        Labs:   Complete Metabolic Panel:  Lab Results   Component Value Date/Time     07/11/2024 08:37 AM    K 3.8 07/11/2024 08:37 AM     07/11/2024 08:37 AM    CO2 24.0 07/11/2024 08:37 AM    CREATSERUM 1.03 07/11/2024 08:37 AM    CA 9.6 07/11/2024 08:37 AM     (H) 07/11/2024 08:37 AM    TP 7.8 07/11/2024 08:37 AM    ALB 4.5 07/11/2024 08:37 AM    ALKPHO 70 07/11/2024 08:37 AM    AST 23 07/11/2024 08:37 AM    ALT 35 07/11/2024 08:37 AM    BILT 0.9 07/11/2024 08:37 AM    TSH 2.911 07/11/2024 08:37 AM    T4F 1.0 10/03/2021 09:43 AM        CBC:  Lab Results   Component Value Date    WBC 5.4 07/11/2024    HGB 16.0 07/11/2024    HCT 46.3 07/11/2024    .0 07/11/2024    NEPERCENT 66.5 07/11/2024    LYPERCENT 21.7 07/11/2024    MOPERCENT 7.9 07/11/2024    EOPERCENT 2.6 07/11/2024    BAPERCENT 1.1 07/11/2024    NE 3.62 07/11/2024    LYMABS 1.18 07/11/2024    MOABSO 0.43 07/11/2024    EOABSO 0.14 07/11/2024    BAABSO 0.06 07/11/2024            Hemoglobin A1C, Microalbumin  Lab Results   Component Value Date/Time    A1C 6.0 (H) 07/11/2024 08:37 AM        Lipid panel  Lab Results   Component Value Date/Time    CHOLEST 164 07/11/2024 08:37 AM    HDL 38 (L) 07/11/2024 08:37 AM    TRIG 125 07/11/2024 08:37 AM     (H) 07/11/2024 08:37 AM    NONHDLC 126 07/11/2024 08:37 AM     The 10-year ASCVD risk score (Robert CLARK, et al., 2019) is: 11.9%    Values used to calculate the score:      Age: 59 years      Sex: Male      Is Non- : No       Diabetic: No      Tobacco smoker: No      Systolic Blood Pressure: 140 mmHg      Is BP treated: Yes      HDL Cholesterol: 38 mg/dL      Total Cholesterol: 164 mg/dL       Medications:  Current Outpatient Medications   Medication Sig Dispense Refill    diclofenac 1 % External Gel Apply 2 g topically 4 (four) times daily. 1 each 1    pantoprazole 40 MG Oral Tab EC TAKE 1 TABLET BY MOUTH EVERY DAY BEFORE EATING DINNER 90 tablet 3    cyclobenzaprine 10 MG Oral Tab Take 1 tablet (10 mg total) by mouth nightly as needed for Muscle spasms (for muscle relaxation). **Can make tired. 90 tablet 0    atorvastatin 20 MG Oral Tab Take 1 tablet (20 mg total) by mouth every evening. 90 tablet 3    lisinopril 10 MG Oral Tab Take 1 tablet (10 mg total) by mouth daily. No refills until seen in office for physical  and labs 90 tablet 0    celecoxib 200 MG Oral Cap Take 1 capsule (200 mg total) by mouth daily. Take with food. This is for pain and inflammation. 90 capsule 1    hydrocortisone-pramoxine perianal 1-1 % External Foam Place 1 applicator rectally 2 (two) times daily. For anal pain and hemorrhoids (Patient not taking: Reported on 7/11/2024) 10 g 1    ERGOCALCIFEROL 1.25 MG (55562 UT) Oral Cap TAKE 1 CAPSULE BY MOUTH ONE TIME PER WEEK (Patient not taking: Reported on 10/26/2023) 12 capsule 0    Multiple Vitamin (MULTI-VITAMIN DAILY) Oral Tab Take by mouth. (Patient not taking: Reported on 10/26/2023)        PMH:  Past Medical History:    Esophageal reflux    I take pantoprazole/only if I need it    Essential hypertension    I take Lisinopril 10mg (1 daily)    Hypercholesterolemia    Prediabetes         PSH:  Past Surgical History:   Procedure Laterality Date    Colonoscopy  12/18/2014    Electrocardiogram, complete  10/10/2013    Scanned to Media Tab    Hemorrhoidectomy         Allergies:  No Known Allergies   Social History:  Social History     Socioeconomic History    Marital status:    Tobacco Use    Smoking status:  Former     Current packs/day: 0.00     Types: Cigarettes     Start date: 2016     Quit date: 2016     Years since quittin.1     Passive exposure: Past    Smokeless tobacco: Never   Vaping Use    Vaping status: Never Used   Substance and Sexual Activity    Alcohol use: No    Drug use: No   Other Topics Concern    Caffeine Concern No    Stress Concern No    Weight Concern No    Special Diet No    Exercise No    Seat Belt No        Family History:  Family History   Problem Relation Age of Onset    Diabetes Maternal Uncle     Stroke Mother         CVA    Psychiatric Brother     Psychiatric Brother           REVIEW OF SYSTEMS:   Review of Systems   Constitutional:  Negative for chills, fatigue, fever and unexpected weight change.   HENT:  Negative for congestion, ear pain, hearing loss, rhinorrhea, sinus pain and sore throat.    Eyes:  Negative for pain, redness and visual disturbance.   Respiratory:  Negative for apnea, cough, chest tightness, shortness of breath and wheezing.    Cardiovascular:  Negative for chest pain, palpitations and leg swelling.   Gastrointestinal:  Negative for abdominal distention, abdominal pain, blood in stool, constipation and nausea.   Endocrine: Negative for cold intolerance, heat intolerance and polyuria.   Genitourinary:  Negative for dysuria, hematuria and urgency.   Musculoskeletal:  Negative for arthralgias, back pain, gait problem, joint swelling, myalgias and neck pain.   Skin:  Negative for rash and wound.   Allergic/Immunologic: Negative for food allergies and immunocompromised state.   Neurological:  Negative for dizziness, seizures, facial asymmetry, speech difficulty, weakness, light-headedness, numbness and headaches.   Hematological:  Negative for adenopathy. Does not bruise/bleed easily.   Psychiatric/Behavioral:  Negative for behavioral problems, sleep disturbance and suicidal ideas. The patient is not nervous/anxious.             PHYSICAL EXAM:   /78    Pulse 72   Temp 97 °F (36.1 °C)   Ht 5' 7\" (1.702 m)   Wt 191 lb (86.6 kg)   SpO2 96%   BMI 29.91 kg/m²  Estimated body mass index is 29.91 kg/m² as calculated from the following:    Height as of this encounter: 5' 7\" (1.702 m).    Weight as of this encounter: 191 lb (86.6 kg).     Wt Readings from Last 3 Encounters:   07/11/24 191 lb (86.6 kg)   10/26/23 188 lb 8 oz (85.5 kg)   01/05/23 192 lb (87.1 kg)       Physical Exam  Vitals reviewed.   Constitutional:       General: He is not in acute distress.     Appearance: He is well-developed.   HENT:      Head: Normocephalic and atraumatic.   Eyes:      Conjunctiva/sclera: Conjunctivae normal.      Pupils: Pupils are equal, round, and reactive to light.   Neck:      Thyroid: No thyromegaly.   Cardiovascular:      Rate and Rhythm: Normal rate and regular rhythm.      Heart sounds: Normal heart sounds, S1 normal and S2 normal. No murmur heard.     No friction rub. No gallop.   Pulmonary:      Effort: Pulmonary effort is normal. No respiratory distress.      Breath sounds: Normal breath sounds. No wheezing or rales.   Chest:      Chest wall: No tenderness.   Abdominal:      General: Bowel sounds are normal. There is no distension.      Palpations: Abdomen is soft. There is no mass.      Tenderness: There is no abdominal tenderness. There is no guarding or rebound.   Musculoskeletal:         General: No tenderness. Normal range of motion.      Cervical back: Normal range of motion.   Lymphadenopathy:      Cervical: No cervical adenopathy.   Skin:     General: Skin is warm.      Findings: No erythema or rash.   Neurological:      Mental Status: He is alert and oriented to person, place, and time.      Cranial Nerves: No cranial nerve deficit.      Deep Tendon Reflexes: Reflexes are normal and symmetric.   Psychiatric:         Behavior: Behavior normal.         Thought Content: Thought content normal.         Judgment: Judgment normal.             ASSESSMENT AND PLAN:    Patient is a 59 year old male who presents primarily presents for:    (Z00.00) Annual physical exam  (primary encounter diagnosis)  Plan: CBC With Differential With Platelet, Comp         Metabolic Panel (14), Lipid Panel            (E78.2) Mixed hyperlipidemia  Plan:   On statin     (E55.9) Vitamin D deficiency  Plan:     (R73.03) Prediabetes  Plan: Hemoglobin A1C            (Z11.59) Need for hepatitis C screening test  Plan: HCV Antibody            (Z12.5) Encounter for screening for malignant neoplasm of prostate  Plan: PSA Total, Screen            (R53.83) Other fatigue  Plan: TSH W Reflex To Free T4 [E]            (Z12.11) Encounter for screening colonoscopy  Plan: Gastro GI Telephone Colon Screen            (Z91.89) Cardiovascular risk factor  Plan: CT CALCIUM SCORING                  Health Maintenance:  Health Maintenance   Topic Date Due    COVID-19 Vaccine (5 - 2023-24 season) 09/01/2023    Annual Depression Screening  01/01/2024    Annual Physical  01/05/2024    HTN: BP Follow-Up  08/11/2024    Colorectal Cancer Screening  12/18/2024    Influenza Vaccine (1) 10/01/2024    DTaP,Tdap,and Td Vaccines (2 - Td or Tdap) 03/31/2026    PSA  07/11/2026    Zoster Vaccines  Completed    Pneumococcal Vaccine: Birth to 64yrs  Aged Out             Meds & Refills for this Visit:  Requested Prescriptions     Signed Prescriptions Disp Refills    diclofenac 1 % External Gel 1 each 1     Sig: Apply 2 g topically 4 (four) times daily.       Orders Placed This Encounter   Procedures    CBC With Differential With Platelet    Comp Metabolic Panel (14)    Hemoglobin A1C    Lipid Panel    HCV Antibody    PSA Total, Screen    TSH W Reflex To Free T4 [E]       Imaging & Consults:  [facility] REFERRAL TO [facility] TELEPHONE COLON SCREEN  CT CALCIUM SCORING        Simone Ramsay MD     Return in about 6 months (around 1/11/2025).  Important issues to follow up on next visit      Patient indicates understanding of the above recommendations  and agrees to the above plan.  Reasurrance and education provided. All questions answered.  Notified to call with any questions, complications, allergies, or worsening or changing symptoms as well as any side effects or complications from the treatments .  Red flags/ ER precautions discussed.    This note was produced using voice recognition software.  As a result, errors may occur.  When identified, these errors have been corrected.  While every attempt is made to correct errors during dictation, errors may still exist.    Simone Ramsay MD  Internal Medicine/Primary Care  [facility]

## 2024-09-19 ENCOUNTER — HOSPITAL ENCOUNTER (OUTPATIENT)
Dept: CT IMAGING | Age: 59
Discharge: HOME | End: 2024-09-19
Attending: INTERNAL MEDICINE

## 2024-09-19 DIAGNOSIS — Z91.89 CARDIOVASCULAR RISK FACTOR: ICD-10-CM

## 2024-09-27 ENCOUNTER — TELEPHONE (OUTPATIENT)
Facility: CLINIC | Age: 59
End: 2024-09-27

## 2024-09-27 ENCOUNTER — OFFICE VISIT (OUTPATIENT)
Facility: CLINIC | Age: 59
End: 2024-09-27
Payer: COMMERCIAL

## 2024-09-27 VITALS
BODY MASS INDEX: 29.82 KG/M2 | HEART RATE: 80 BPM | SYSTOLIC BLOOD PRESSURE: 126 MMHG | DIASTOLIC BLOOD PRESSURE: 75 MMHG | HEIGHT: 67 IN | WEIGHT: 190 LBS

## 2024-09-27 DIAGNOSIS — R13.19 ESOPHAGEAL DYSPHAGIA: ICD-10-CM

## 2024-09-27 DIAGNOSIS — R19.8 FULLNESS OF ABDOMEN: ICD-10-CM

## 2024-09-27 DIAGNOSIS — K21.9 GASTROESOPHAGEAL REFLUX DISEASE, UNSPECIFIED WHETHER ESOPHAGITIS PRESENT: ICD-10-CM

## 2024-09-27 DIAGNOSIS — Z12.11 COLON CANCER SCREENING: Primary | ICD-10-CM

## 2024-09-27 DIAGNOSIS — R19.8 RECTAL PRESSURE: ICD-10-CM

## 2024-09-27 DIAGNOSIS — Z12.11 SCREENING FOR COLON CANCER: Primary | ICD-10-CM

## 2024-09-27 PROCEDURE — 99204 OFFICE O/P NEW MOD 45 MIN: CPT

## 2024-09-27 RX ORDER — HYDROCORTISONE ACETATE 25 MG/1
25 SUPPOSITORY RECTAL 2 TIMES DAILY
Qty: 14 SUPPOSITORY | Refills: 0 | Status: SHIPPED | OUTPATIENT
Start: 2024-09-27 | End: 2024-10-04

## 2024-09-27 RX ORDER — SODIUM, POTASSIUM,MAG SULFATES 17.5-3.13G
SOLUTION, RECONSTITUTED, ORAL ORAL
Qty: 1 EACH | Refills: 0 | Status: SHIPPED | OUTPATIENT
Start: 2024-09-27

## 2024-09-27 NOTE — TELEPHONE ENCOUNTER
Scheduled for:  Colonoscopy 92524 EGD 24086  Provider Name:  Dr Tierney  Date:  1/23/2025  Location:  [facility] [place]  Sedation:  MAC  Time:  (pt is aware that [facility] [place] will call the day before to confirm arrival time)  Prep:  Suprep  Meds/Allergies Reconciled?:  CORTES Alcantar reviewed.  Diagnosis with codes:    CRC screening Z12.11  GERD K21.9  Fullness R19.8  Rectal pressure R19.8  Dysphagia R13.19  Was patient informed to call insurance with codes (Y/N):  Yes  Referral sent?:  Referral was sent at the time of electronic surgical scheduling.  [facility] or [facility] notified?:  I sent an electronic request to Endo Scheduling and received a confirmation today.  Medication Orders:  CORTES Alcantar reviewed.  Misc Orders:  Patient is aware to NOT take iron pills, herbal meds and diet supplements for 7 days before exam. Also to NOT take any form of alcohol, recreational drugs and any forms of ED meds 24-72 hours before exam.    Further instructions given by staff:  I discussed the prep instructions with the patient which he verbally understood. I provided patient with prep instruction's sheet in office.      Patient was informed about the new cancellation policy for his/her procedure. Patient was also given a copy of the cancellation policy at the time of the appointment and verbalized understanding.

## 2024-09-27 NOTE — H&P
[facility] - Gastroenterology                                                                                                  Clinic History and Physical     Chief Complaint   Patient presents with    Consult     Colonoscopy    Hemorrhoids       Requesting physician or provider: Simone Ramsay MD    HPI:   Jose Workman is a 59 year old year-old male with active diagnoses including hypertension, hyperlipidemia, GERD, back pain. Prior medical/surgical hx in note table. The patient presents for colon cancer screening evaluation.    #GERD  #fullness  #dysphagia  -intermittent GERD symptoms for >10 years. Mostly triggered by spicy food. He takes pantoprazole 40mg 2-3x per week. He takes it before a meal. Still gets breakthrough symptoms  -he does report fullness in the past year. Reports even when eating small amount it can feel sensation like food is sitting in stomach and not digesting quickly. This is intermittent   -dysphagia x 1 year, mostly occurs with pills, sensation of pill getting caught mid esophagus. Usually able to clear by eating food after    #rectal pressure  -reports sensation of swelling in rectum during bowel movement. Has felt this for >6 months. Describes possible protruding hemorrhoid that he is able to push back in when he wipes. Sometimes sensation of incomplete evacuation. Denies straining or sitting on toilet for long time. He has 1-2 bowel movements daily, usually soft or loose    Patient is here today as a referral from his PCP for evaluation prior to undergoing colonoscopy for CRC screening. Patient denies any GI symptoms of nausea, vomiting, hematemesis, abdominal pain, change in bowel habits, constipation, diarrhea, hematochezia, or melena.  Additionally there is no unintentional weight loss.    Pt is due for CRC screening. We discussed the available screening options for CRC such as FIT and cologuard. They are electing to pursue colonoscopy at this time.     Last colonoscopy:   Dr. Tierney   Last EGD:   Dr. Tierney  FINAL DIAGNOSIS                   Distal esophagus; biopsy:    *  Multiple fragments of squamous epithelium demonstrating changes compatible with mild reflux esophagitis.    *  Separate fragments of glandular gastric-type mucosa with lamina propria mild inactive chronic inflammation.    *  No evidence of goblet cell intestinal metaplasia, dysplasia or malignancy is identified.    *  Diff-Quik stain (with appropriate control reactivity) is negative for Helicobacter pylori microorganisms.     NSAIDS: celecoxib as needed  Tobacco: former  Alcohol: none   Marijuana: none   Illicit drugs: none     FH GI malignancy: none     No history of adverse reaction to sedation  No KEHINDE  No anticoagulants/antiplatelet  No pacemaker/defibrillator  No pain medications and/or sleep aides    Wt Readings from Last 6 Encounters:   24 190 lb (86.2 kg)   24 191 lb (86.6 kg)   10/26/23 188 lb 8 oz (85.5 kg)   23 192 lb (87.1 kg)   21 194 lb 9.6 oz (88.3 kg)   10/16/20 185 lb (83.9 kg)          History, Medications, Allergies, ROS:      Past Medical History:    Esophageal reflux    I take pantoprazole/only if I need it    Essential hypertension    I take Lisinopril 10mg (1 daily)    Hypercholesterolemia    Prediabetes      Past Surgical History:   Procedure Laterality Date    Colonoscopy  2014    Electrocardiogram, complete  10/10/2013    Scanned to Media Tab    Hemorrhoidectomy        Family Hx:   Family History   Problem Relation Age of Onset    Diabetes Maternal Uncle     Stroke Mother         CVA    Psychiatric Brother     Psychiatric Brother       Social History:   Social History     Socioeconomic History    Marital status:    Tobacco Use    Smoking status: Former     Current packs/day: 0.00     Types: Cigarettes     Start date: 2016     Quit date: 2016     Years since quittin.3     Passive exposure: Past    Smokeless tobacco: Never   Vaping Use     Vaping status: Never Used   Substance and Sexual Activity    Alcohol use: No    Drug use: No   Other Topics Concern    Caffeine Concern No    Stress Concern No    Weight Concern No    Special Diet No    Exercise No    Seat Belt No        Medications (Active prior to today's visit):  Current Outpatient Medications   Medication Sig Dispense Refill    hydrocortisone 25 MG Rectal Suppos Place 1 suppository (25 mg total) rectally 2 (two) times daily for 7 days. 14 suppository 0    Na Sulfate-K Sulfate-Mg Sulf (SUPREP BOWEL PREP KIT) 17.5-3.13-1.6 GM/177ML Oral Solution Take as directed by GI clinic. Okay to substitute for generic. 1 each 0    atorvastatin 20 MG Oral Tab Take 1 tablet (20 mg total) by mouth every evening. 90 tablet 3    lisinopril 10 MG Oral Tab Take 1 tablet (10 mg total) by mouth daily. No refills until seen in office for physical  and labs 90 tablet 0    celecoxib 200 MG Oral Cap Take 1 capsule (200 mg total) by mouth daily. Take with food. This is for pain and inflammation. 90 capsule 1    pantoprazole 40 MG Oral Tab EC TAKE 1 TABLET BY MOUTH EVERY DAY BEFORE EATING DINNER 90 tablet 3       Allergies:  No Known Allergies    ROS:   CONSTITUTIONAL: negative for fevers, chills, sweats  EYES Negative for scleral icterus or redness, and diplopia  HEENT: Negative for hoarseness  RESPIRATORY: Negative for cough and severe shortness of breath  CARDIOVASCULAR: Negative for crushing sub-sternal chest pain  GASTROINTESTINAL: See HPI  GENITOURINARY: Negative for dysuria  MUSCULOSKELETAL: Negative for arthralgias and myalgias  SKIN: Negative for jaundice, rash or pruritus  NEUROLOGICAL: Negative for dizziness and headaches  BEHAVIOR/PSYCH: Negative for psychotic behavior      PHYSICAL EXAM:   Blood pressure 126/75, pulse 80, height 5' 7\" (1.702 m), weight 190 lb (86.2 kg).    GEN: Alert, no acute distress, well-nourished   HEENT: anicteric sclera, neck supple, trachea midline, MMM, no palpable or tender neck or  supraclavicular lymph nodes  CV: RRR, the extremities are warm and well perfused   LUNGS: No increased work of breathing, CTAB  ABDOMEN: Soft, symmetrical, non-tender without distention or guarding. No scars or lesions. Aorta is without bruit or visible pulsation. Umbilicus is midline without herniation. Normoactive bowel sounds are present, No masses, hepatomegaly or splenomegaly noted.  MSK: No erythema, no warmth, no swelling of joints  SKIN: No jaundice, no erythema, no rashes, no lesions  HEMATOLOGIC: No bleeding, no bruising  NEURO: Alert and interactive, LAUREANO  PSYCH: appropriate mood & affect    Labs/Imaging:     Patient's labs and imaging were reviewed and discussed with patient today.    .  ASSESSMENT/PLAN:   Jose Workman is a 59 year old year-old male with active diagnoses including hypertension, hyperlipidemia, GERD, back pain. Prior medical/surgical hx in note table. The patient presents for colon cancer screening evaluation.    #GERD  #fullness  #dysphagia  -intermittent GERD symptoms for >10 years. Mostly triggered by spicy food. He takes pantoprazole 40mg 2-3x per week. He takes it before a meal. Still gets breakthrough symptoms  -he does report fullness in the past year. Reports even when eating small amount it can feel sensation like food is sitting in stomach and not digesting quickly. This is intermittent   -dysphagia x 1 year, mostly occurs with pills, sensation of pill getting caught mid esophagus. Usually able to clear by eating food after  -EGD to assess for barretts. Risk factors: male, >50, former smoker, Gerd>5 years, chronic PPI use. Last EGD in 2014 positive biopsy for gastritis and esophagitis.     #rectal pressure  -reports sensation of swelling in rectum during bowel movement. Has felt this for >6 months. Describes possible protruding hemorrhoid that he is able to push back in when he wipes. Sometimes sensation of incomplete evacuation. Denies straining or sitting on toilet for long  time. He has 1-2 bowel movements daily, usually soft or loose  -possible hemorrhoids or mild rectal prolapse. Advised trial of hydrocortisone supp and fiber supplement. Advised pt to follow up if not improving    #colorectal cancer screening: patient is considered average risk for colon cancer (No immediate family hx of colon cancer) and it is appropriate to proceed with screening colonoscopy. Patient is currently asymptomatic and denies diarrhea, hematochezia, thin-stools or weight loss. We discussed risks/benefits/alternatives to procedure, including stool testing, they want to proceed with colonoscopy.  -no anemia noted on blood work July 2024.  -prior CLN in 2014    Recommend:  -start taking a fiber supplement. Such as metamucil, generic psyllium husk fiber     -use rectal hydrocortisone suppositories 2x daily for 1 week     -use pantoprazole 40mg every day for 2 months. If still having symptoms multiple days a week.then it is necessary to have the upper endoscopy and likely need to avoid GERD triggers more and continue pantoprazole daily    -follow up 1 month after endoscopy. Or sooner if symptoms worsen or do not improve    -Schedule EGD & colonoscopy with Dr Tierney  Diagnosis: GERD, dysphagia, fullness, rectal pressure, CRC screening  Sedation: MAC  Prep: split dose suprep    -Anti-platelets and anti-coagulants: N/A   -Diabetes meds: N/A     Endoscopy risk/benefit discussion: I have thoroughly discussed the risks, benefits, and alternatives of endoscopic evaluation with the patient, who demonstrated understanding. This includes the potential risks of bleeding, infection, pain, anesthesia complications, and perforation, which may result in prolonged hospitalization or surgical intervention. All of the patient’s questions were addressed to their satisfaction. The patient has chosen to proceed with the endoscopic procedure, including any necessary interventions such as polypectomy, biopsy, control of  bleeding.      Orders This Visit:  No orders of the defined types were placed in this encounter.      Meds This Visit:  Requested Prescriptions     Signed Prescriptions Disp Refills    hydrocortisone 25 MG Rectal Suppos 14 suppository 0     Sig: Place 1 suppository (25 mg total) rectally 2 (two) times daily for 7 days.    Na Sulfate-K Sulfate-Mg Sulf (SUPREP BOWEL PREP KIT) 17.5-3.13-1.6 GM/177ML Oral Solution 1 each 0     Sig: Take as directed by GI clinic. Okay to substitute for generic.       Imaging & Referrals:  None       CORTES Dodson    [facility]  9/27/2024      The dictation was partially prepared using Dragon Medical voice recognition software. As a result, errors may occur. When identified, these errors have been corrected. While every attempt is made to correct errors during dictation, discrepancies may still exist.

## 2024-09-27 NOTE — PATIENT INSTRUCTIONS
-start taking a fiber supplement. Such as metamucil, generic psyllium husk fiber     -use rectal hydrocortisone suppositories 2x daily for 1 week     -use pantoprazole 40mg every day for 2 months. If still having symptoms multiple days a week.then it is necessary to have the upper endoscopy and likely need to avoid GERD triggers more and continue pantoprazole daily    -follow up 1 month after endoscopy. Or sooner if symptoms worsen or do not improve        1. Schedule EGD & colonoscopy with Dr Tierney  Diagnosis: GERD, dysphagia, fullness, rectal pressure, CRC screening  Sedation: MAC  Prep: split dose suprep    2.  bowel prep from pharmacy   You can pick the bowel prep up now and store in a cool, dry place in your home until your scheduled bowel prep start date.    3. Continue all medications as normal for your procedure. DO NOT TAKE: Any form of alcohol, recreational drugs and any forms of erectile dysfunction medications 24 hours prior to procedure.    4. Read all bowel prep instructions carefully. Bowel prep instructions can also be found online at:  [email or web address].org/giprep     5. AVOID seeds, nuts, popcorn, raw fruits and vegetables for 5 days before procedure    6. If you start any NEW medication after your visit today, please notify us. Certain medications (like iron or weight loss medications) will need to be held before the procedure, or the procedure cannot be performed safely.

## 2024-10-04 DIAGNOSIS — R91.8 PULMONARY NODULES: Primary | ICD-10-CM

## 2025-01-16 ENCOUNTER — OFFICE VISIT (OUTPATIENT)
Dept: INTERNAL MEDICINE CLINIC | Facility: CLINIC | Age: 60
End: 2025-01-16
Payer: COMMERCIAL

## 2025-01-16 VITALS
WEIGHT: 192 LBS | HEART RATE: 99 BPM | BODY MASS INDEX: 30.13 KG/M2 | OXYGEN SATURATION: 97 % | DIASTOLIC BLOOD PRESSURE: 60 MMHG | SYSTOLIC BLOOD PRESSURE: 120 MMHG | HEIGHT: 67 IN

## 2025-01-16 DIAGNOSIS — E78.2 MIXED HYPERLIPIDEMIA: ICD-10-CM

## 2025-01-16 DIAGNOSIS — Z91.89 CARDIOVASCULAR RISK FACTOR: ICD-10-CM

## 2025-01-16 DIAGNOSIS — I10 ESSENTIAL HYPERTENSION: ICD-10-CM

## 2025-01-16 DIAGNOSIS — Z00.00 ANNUAL PHYSICAL EXAM: Primary | ICD-10-CM

## 2025-01-16 RX ORDER — LISINOPRIL 10 MG/1
10 TABLET ORAL DAILY
Qty: 90 TABLET | Refills: 2 | Status: SHIPPED | OUTPATIENT
Start: 2025-01-16

## 2025-01-16 RX ORDER — ATORVASTATIN CALCIUM 20 MG/1
20 TABLET, FILM COATED ORAL EVERY EVENING
Qty: 90 TABLET | Refills: 2 | Status: SHIPPED | OUTPATIENT
Start: 2025-01-16

## 2025-01-16 RX ORDER — CELECOXIB 200 MG/1
200 CAPSULE ORAL DAILY PRN
Qty: 90 CAPSULE | Refills: 1 | Status: SHIPPED | OUTPATIENT
Start: 2025-01-16

## 2025-01-16 NOTE — PROGRESS NOTES
[facility] part of [facility]  Return Patient Progress Note      HPI:     Chief Complaint   Patient presents with    Follow - Up     6 months f/u    Muscle Pain    Chest Pressure     Chest pressure x 5 days/  no pain        Jose Workman is a 59 year old male presenting for:    Has a significant  has a past medical history of Esophageal reflux, Essential hypertension, Hypercholesterolemia, and Prediabetes.    59-year-old male with a history of essential hypertension, hyperlipidemia, and elevated cardiac calcium score presents for an annual physical exam. The patient reports a recent discovery of a family history of cardiovascular disease, with some relatives experiencing myocardial infarctions before age 65. He denies current chest pain or dyspnea but mentions an isolated incident of chest discomfort 7-10 days ago, related to a significant emotional event.    Essential Hypertension: Controlled with lisinopril 10 mg daily.  Hyperlipidemia: Managed with atorvastatin 20 mg daily.  Elevated Cardiac Calcium Score: Testing performed in September.  Family History: Noted cardiovascular disease with early myocardial infarctions in the family.      Labs:   CMP:  Lab Results   Component Value Date/Time     07/11/2024 08:37 AM    K 3.8 07/11/2024 08:37 AM     07/11/2024 08:37 AM    CO2 24.0 07/11/2024 08:37 AM    CREATSERUM 1.03 07/11/2024 08:37 AM    CA 9.6 07/11/2024 08:37 AM     (H) 07/11/2024 08:37 AM    TP 7.8 07/11/2024 08:37 AM    ALB 4.5 07/11/2024 08:37 AM    ALKPHO 70 07/11/2024 08:37 AM    AST 23 07/11/2024 08:37 AM    ALT 35 07/11/2024 08:37 AM    BILT 0.9 07/11/2024 08:37 AM    TSH 2.911 07/11/2024 08:37 AM    T4F 1.0 10/03/2021 09:43 AM        CBC:  Lab Results   Component Value Date    WBC 5.4 07/11/2024    HGB 16.0 07/11/2024    HCT 46.3 07/11/2024    .0 07/11/2024    NEPERCENT 66.5 07/11/2024    LYPERCENT 21.7 07/11/2024    MOPERCENT 7.9 07/11/2024    EOPERCENT 2.6  07/11/2024    BAPERCENT 1.1 07/11/2024    NE 3.62 07/11/2024    LYMABS 1.18 07/11/2024    MOABSO 0.43 07/11/2024    EOABSO 0.14 07/11/2024    BAABSO 0.06 07/11/2024          Hemoglobin A1C, Microalbumin  Lab Results   Component Value Date/Time    A1C 6.0 (H) 07/11/2024 08:37 AM        Lipid panel  Lab Results   Component Value Date/Time    CHOLEST 164 07/11/2024 08:37 AM    HDL 38 (L) 07/11/2024 08:37 AM    TRIG 125 07/11/2024 08:37 AM     (H) 07/11/2024 08:37 AM    NONHDLC 126 07/11/2024 08:37 AM        Medications:  Current Outpatient Medications   Medication Sig Dispense Refill    Na Sulfate-K Sulfate-Mg Sulf (SUPREP BOWEL PREP KIT) 17.5-3.13-1.6 GM/177ML Oral Solution Take as directed by GI clinic. Okay to substitute for generic. 1 each 0    atorvastatin 20 MG Oral Tab Take 1 tablet (20 mg total) by mouth every evening. 90 tablet 3    lisinopril 10 MG Oral Tab Take 1 tablet (10 mg total) by mouth daily. No refills until seen in office for physical  and labs 90 tablet 0    celecoxib 200 MG Oral Cap Take 1 capsule (200 mg total) by mouth daily. Take with food. This is for pain and inflammation. 90 capsule 1    pantoprazole 40 MG Oral Tab EC TAKE 1 TABLET BY MOUTH EVERY DAY BEFORE EATING DINNER 90 tablet 3      PMH:  Past Medical History:    Esophageal reflux    I take pantoprazole/only if I need it    Essential hypertension    I take Lisinopril 10mg (1 daily)    Hypercholesterolemia    Prediabetes               REVIEW OF SYSTEMS:   Review of Systems   Constitutional:  Negative for chills, fatigue, fever and unexpected weight change.   HENT:  Negative for congestion, ear pain, hearing loss, rhinorrhea, sinus pain and sore throat.    Eyes:  Negative for pain, redness and visual disturbance.   Respiratory:  Negative for apnea, cough, chest tightness, shortness of breath and wheezing.    Cardiovascular:  Negative for chest pain, palpitations and leg swelling.   Gastrointestinal:  Negative for abdominal  distention, abdominal pain, blood in stool, constipation and nausea.   Endocrine: Negative for cold intolerance, heat intolerance and polyuria.   Genitourinary:  Negative for dysuria, hematuria and urgency.   Musculoskeletal:  Negative for arthralgias, back pain, gait problem, joint swelling, myalgias and neck pain.   Skin:  Negative for rash and wound.   Allergic/Immunologic: Negative for food allergies and immunocompromised state.   Neurological:  Negative for dizziness, seizures, facial asymmetry, speech difficulty, weakness, light-headedness, numbness and headaches.   Hematological:  Negative for adenopathy. Does not bruise/bleed easily.   Psychiatric/Behavioral:  Negative for behavioral problems, sleep disturbance and suicidal ideas. The patient is not nervous/anxious.             PHYSICAL EXAM:   /60   Pulse 99   Ht 5' 7\" (1.702 m)   Wt 192 lb (87.1 kg)   SpO2 97%   BMI 30.07 kg/m²  Estimated body mass index is 30.07 kg/m² as calculated from the following:    Height as of this encounter: 5' 7\" (1.702 m).    Weight as of this encounter: 192 lb (87.1 kg).     Wt Readings from Last 3 Encounters:   01/16/25 192 lb (87.1 kg)   01/09/25 190 lb (86.2 kg)   09/27/24 190 lb (86.2 kg)       Physical Exam  Vitals reviewed.   Constitutional:       General: He is not in acute distress.     Appearance: He is well-developed.   HENT:      Head: Normocephalic and atraumatic.   Eyes:      Conjunctiva/sclera: Conjunctivae normal.      Pupils: Pupils are equal, round, and reactive to light.   Neck:      Thyroid: No thyromegaly.   Cardiovascular:      Rate and Rhythm: Normal rate and regular rhythm.      Heart sounds: Normal heart sounds, S1 normal and S2 normal. No murmur heard.     No friction rub. No gallop.   Pulmonary:      Effort: Pulmonary effort is normal. No respiratory distress.      Breath sounds: Normal breath sounds. No wheezing or rales.   Chest:      Chest wall: No tenderness.   Abdominal:      General:  Bowel sounds are normal. There is no distension.      Palpations: Abdomen is soft. There is no mass.      Tenderness: There is no abdominal tenderness. There is no guarding or rebound.   Musculoskeletal:         General: No tenderness. Normal range of motion.      Cervical back: Normal range of motion.   Lymphadenopathy:      Cervical: No cervical adenopathy.   Skin:     General: Skin is warm.      Findings: No erythema or rash.   Neurological:      Mental Status: He is alert and oriented to person, place, and time.      Cranial Nerves: No cranial nerve deficit.      Deep Tendon Reflexes: Reflexes are normal and symmetric.   Psychiatric:         Behavior: Behavior normal.         Thought Content: Thought content normal.         Judgment: Judgment normal.               ASSESSMENT AND PLAN:   Patient is a 59 year old male who presents primarily presents for:    Annual Physical Exam:     Performed age-appropriate screening and vaccine counseling. Administered Prevnar 20 vaccine. Ordered EKG and stress test. Colonoscopy scheduled for next week.    Essential Hypertension:     Well-controlled on current medication regimen. Continue lisinopril (less than 10 mg daily). Maintain healthy diet.    Hyperlipidemia:     Continue atorvastatin 20 mg daily.    Elevated Cardiac Calcium Score:     History of elevated cardiac calcium score tested in September. Combined with newly reported family history of cardiovascular disease, increases cardiovascular risk. Stress test ordered to further evaluate cardiovascular risk.        Medications:  Lisinopril (less than 10 mg daily)  Atorvastatin 20 mg daily      Health Maintenance:    Health Maintenance   Topic Date Due    Pneumococcal Vaccine: 50+ Years (1 of 1 - PCV) Never done    Annual Physical  01/05/2024    COVID-19 Vaccine (5 - 2024-25 season) 09/01/2024    Colorectal Cancer Screening  12/18/2024    DTaP,Tdap,and Td Vaccines (2 - Td or Tdap) 03/31/2026    PSA  07/11/2026    Influenza  Vaccine  Completed    Annual Depression Screening  Completed    Zoster Vaccines  Completed    Meningococcal B Vaccine  Aged Out         Meds & Refills for this Visit:  Requested Prescriptions      No prescriptions requested or ordered in this encounter       No orders of the defined types were placed in this encounter.      Imaging & Consults:  None          No follow-ups on file.  Important follow up notes/labs for next visit      Patient indicates understanding of the above recommendations and agrees to the above plan.  Reasurrance and education provided. All questions answered.    Notified to call with any questions, complications, allergies, or worsening or changing symptoms as well as any side effects or complications from the treatments .  Red flags/ ER precautions discussed.    If diagnostic labs or imaging ordered advised patient to contact my office for results  24-48 hours after completion    This note was dictated using dragon speech recognition transcription software.  Typographical and transcription errors may be present.  Please call if any questions.       As part of our commitment to providing you with comprehensive, transparent, and timely access to your health information, we adhere to the guidelines set forth by the 21st Century Cures Act. This Act enhances your rights to access your electronic health information and ensures that you can easily obtain your medical records.  Please note that the verbage used in this note is intended for medical documentation and communication and may be interpreted as forthright.  Please do not hesitate to contact my office if you have any questions.            Simone Ramsay MD  [facility]

## 2025-01-18 ENCOUNTER — LAB ENCOUNTER (OUTPATIENT)
Dept: LAB | Facility: HOSPITAL | Age: 60
End: 2025-01-18
Attending: INTERNAL MEDICINE
Payer: COMMERCIAL

## 2025-01-18 DIAGNOSIS — Z00.00 ANNUAL PHYSICAL EXAM: ICD-10-CM

## 2025-01-18 DIAGNOSIS — Z91.89 CARDIOVASCULAR RISK FACTOR: ICD-10-CM

## 2025-01-18 LAB
ALBUMIN SERPL-MCNC: 4.7 G/DL (ref 3.2–4.8)
ALBUMIN/GLOB SERPL: 1.7 {RATIO} (ref 1–2)
ALP LIVER SERPL-CCNC: 110 U/L
ALT SERPL-CCNC: 37 U/L
ANION GAP SERPL CALC-SCNC: 6 MMOL/L (ref 0–18)
AST SERPL-CCNC: 25 U/L (ref ?–34)
ATRIAL RATE: 80 BPM
BASOPHILS # BLD AUTO: 0.06 X10(3) UL (ref 0–0.2)
BASOPHILS NFR BLD AUTO: 1.3 %
BILIRUB SERPL-MCNC: 0.6 MG/DL (ref 0.2–1.1)
BUN BLD-MCNC: 18 MG/DL (ref 9–23)
BUN/CREAT SERPL: 15.8 (ref 10–20)
CALCIUM BLD-MCNC: 9.8 MG/DL (ref 8.7–10.4)
CHLORIDE SERPL-SCNC: 110 MMOL/L (ref 98–112)
CHOLEST SERPL-MCNC: 141 MG/DL (ref ?–200)
CO2 SERPL-SCNC: 27 MMOL/L (ref 21–32)
CREAT BLD-MCNC: 1.14 MG/DL
DEPRECATED RDW RBC AUTO: 38.8 FL (ref 35.1–46.3)
EGFRCR SERPLBLD CKD-EPI 2021: 74 ML/MIN/1.73M2 (ref 60–?)
EOSINOPHIL # BLD AUTO: 0.23 X10(3) UL (ref 0–0.7)
EOSINOPHIL NFR BLD AUTO: 4.9 %
ERYTHROCYTE [DISTWIDTH] IN BLOOD BY AUTOMATED COUNT: 12.8 % (ref 11–15)
FASTING PATIENT LIPID ANSWER: YES
FASTING STATUS PATIENT QL REPORTED: YES
GLOBULIN PLAS-MCNC: 2.7 G/DL (ref 2–3.5)
GLUCOSE BLD-MCNC: 120 MG/DL (ref 70–99)
HCT VFR BLD AUTO: 46 %
HDLC SERPL-MCNC: 37 MG/DL (ref 40–59)
HGB BLD-MCNC: 15.6 G/DL
IMM GRANULOCYTES # BLD AUTO: 0.01 X10(3) UL (ref 0–1)
IMM GRANULOCYTES NFR BLD: 0.2 %
LDLC SERPL CALC-MCNC: 81 MG/DL (ref ?–100)
LYMPHOCYTES # BLD AUTO: 1.33 X10(3) UL (ref 1–4)
LYMPHOCYTES NFR BLD AUTO: 28.2 %
MCH RBC QN AUTO: 28.7 PG (ref 26–34)
MCHC RBC AUTO-ENTMCNC: 33.9 G/DL (ref 31–37)
MCV RBC AUTO: 84.7 FL
MONOCYTES # BLD AUTO: 0.51 X10(3) UL (ref 0.1–1)
MONOCYTES NFR BLD AUTO: 10.8 %
NEUTROPHILS # BLD AUTO: 2.58 X10 (3) UL (ref 1.5–7.7)
NEUTROPHILS # BLD AUTO: 2.58 X10(3) UL (ref 1.5–7.7)
NEUTROPHILS NFR BLD AUTO: 54.6 %
NONHDLC SERPL-MCNC: 104 MG/DL (ref ?–130)
OSMOLALITY SERPL CALC.SUM OF ELEC: 299 MOSM/KG (ref 275–295)
P AXIS: 53 DEGREES
P-R INTERVAL: 178 MS
PLATELET # BLD AUTO: 182 10(3)UL (ref 150–450)
POTASSIUM SERPL-SCNC: 3.8 MMOL/L (ref 3.5–5.1)
PROT SERPL-MCNC: 7.4 G/DL (ref 5.7–8.2)
Q-T INTERVAL: 380 MS
QRS DURATION: 92 MS
QTC CALCULATION (BEZET): 438 MS
R AXIS: 67 DEGREES
RBC # BLD AUTO: 5.43 X10(6)UL
SODIUM SERPL-SCNC: 143 MMOL/L (ref 136–145)
T AXIS: 46 DEGREES
TRIGL SERPL-MCNC: 125 MG/DL (ref 30–149)
VENTRICULAR RATE: 80 BPM
VLDLC SERPL CALC-MCNC: 20 MG/DL (ref 0–30)
WBC # BLD AUTO: 4.7 X10(3) UL (ref 4–11)

## 2025-01-18 PROCEDURE — 93005 ELECTROCARDIOGRAM TRACING: CPT

## 2025-01-18 PROCEDURE — 80053 COMPREHEN METABOLIC PANEL: CPT

## 2025-01-18 PROCEDURE — 93010 ELECTROCARDIOGRAM REPORT: CPT | Performed by: INTERNAL MEDICINE

## 2025-01-18 PROCEDURE — 80061 LIPID PANEL: CPT | Performed by: INTERNAL MEDICINE

## 2025-01-18 PROCEDURE — 85025 COMPLETE CBC W/AUTO DIFF WBC: CPT | Performed by: INTERNAL MEDICINE

## 2025-01-18 PROCEDURE — 36415 COLL VENOUS BLD VENIPUNCTURE: CPT | Performed by: INTERNAL MEDICINE

## 2025-01-22 ENCOUNTER — TELEPHONE (OUTPATIENT)
Facility: CLINIC | Age: 60
End: 2025-01-22

## 2025-01-22 NOTE — TELEPHONE ENCOUNTER
I spoke to the pt and I answered all of his questions regarding his bowel prep    All of his questions were answered and he voices understanding

## 2025-01-23 PROBLEM — K44.9 HIATAL HERNIA: Status: ACTIVE | Noted: 2025-01-23

## 2025-01-23 PROCEDURE — 88305 TISSUE EXAM BY PATHOLOGIST: CPT | Performed by: INTERNAL MEDICINE

## 2025-01-23 PROCEDURE — 88312 SPECIAL STAINS GROUP 1: CPT | Performed by: INTERNAL MEDICINE

## 2025-02-27 NOTE — PATIENT INSTRUCTIONS
-make an appointment with colo-rectal surgeon, Dr. Satnacruz to discuss option for prolapsing hemorrhoid  -Dr. Santacruz: [facility]  #[phone]    -keep taking fiber supplement daily     -colonoscopy repeat in 10 years, due at age 69    -for acid reflux  Avoid triggers such as spicy food  Avoid eating 2-3 hours before laying down  Takes pantoprazole 20mg daily     -follow up once a year

## 2025-02-27 NOTE — PROGRESS NOTES
[facility] - Gastroenterology                                                                                                  Clinic History and Physical     No chief complaint on file.      Requesting physician or provider: Simone Ramsay MD    HPI:   Jose Hung is a 59 year old year-old male with active diagnoses including hypertension, hyperlipidemia, GERD, back pain. Prior medical/surgical hx in note table. The patient presents for follow up.    Today:  #hemorrhoids  -pt reports prolapsing hemorrhoid still protrudes almost every bowel movement. Denies pain or bleeding. Baseline has bowel movement 2x daily of soft stool. He is taking fiber supplement   -colonoscopy in January \"Medium sized internal hemorrhoids with one protuberant bundle palpable on rectal exam and appreciated on exam with the colonoscope, likely prolapsing per patient's description of significant hemorrhoid symptoms \". Photo in media    #GERD  #hiatal hernia  -since prior visit GERD symptoms have been improving with taking pantoprazole daily or every other day. Early satiety is intermittent. Dysphagia only with pills, able to clear with water or food.    -EGD in January finding of 3-4cm hiatal hernia, mild chronic inactive gastritis    Prior visit 9/27/2024:  #GERD  #fullness  #dysphagia  -intermittent GERD symptoms for >10 years. Mostly triggered by spicy food. He takes pantoprazole 40mg 2-3x per week. He takes it before a meal. Still gets breakthrough symptoms  -he does report fullness in the past year. Reports even when eating small amount it can feel sensation like food is sitting in stomach and not digesting quickly. This is intermittent   -dysphagia x 1 year, mostly occurs with pills, sensation of pill getting caught mid esophagus. Usually able to clear by eating food after    #rectal pressure  -reports sensation of swelling in rectum during bowel movement. Has felt this for >6 months. Describes possible protruding  hemorrhoid that he is able to push back in when he wipes. Sometimes sensation of incomplete evacuation. Denies straining or sitting on toilet for long time. He has 1-2 bowel movements daily, usually soft or loose    Patient denies any GI symptoms of nausea, vomiting, hematemesis, abdominal pain, change in bowel habits, constipation, diarrhea, hematochezia, or melena.  Additionally there is no unintentional weight loss.    Last colonoscopy:   1/23/2025 Dr. Tierney - 10 year recall   Wide bundle of likely prolapsing hemorrhoidal tissue appreciated distal rectal vault and anal canal.  No colon polyps or neoplasm on colonoscopy examination today.  Rare, early colonic diverticuli sigmoid colon     2014 Dr. Tierney     Last EGD:    1/23/2025 Dr. Tierney  3-4 cm uncomplicated hiatal hernia, otherwise healthy exam of esophagus.  Mild nonspecific chronic nonerosive gastritis changes in the stomach; random gastric mucosal biopsies obtained.  Gastric biopsy:   Mild chronic inactive gastritis.  Diff-Quik stain (appropriate control reactivity) shows no definitive evidence of H. pylori-like microorganisms.  No evidence of dysplasia or carcinoma identified.    2014 Dr. Tierney  FINAL DIAGNOSIS                   Distal esophagus; biopsy:    *  Multiple fragments of squamous epithelium demonstrating changes compatible with mild reflux esophagitis.    *  Separate fragments of glandular gastric-type mucosa with lamina propria mild inactive chronic inflammation.    *  No evidence of goblet cell intestinal metaplasia, dysplasia or malignancy is identified.    *  Diff-Quik stain (with appropriate control reactivity) is negative for Helicobacter pylori microorganisms.     NSAIDS: celecoxib as needed  Tobacco: former  Alcohol: none   Marijuana: none   Illicit drugs: none     FH GI malignancy: none     No history of adverse reaction to sedation  No KEHINDE  No anticoagulants/antiplatelet  No pacemaker/defibrillator  No pain medications and/or sleep  aides    Wt Readings from Last 6 Encounters:   25 189 lb (85.7 kg)   25 190 lb (86.2 kg)   25 192 lb (87.1 kg)   24 190 lb (86.2 kg)   24 191 lb (86.6 kg)   10/26/23 188 lb 8 oz (85.5 kg)          History, Medications, Allergies, ROS:      Past Medical History:    Esophageal reflux    I take pantoprazole/only if I need it    Essential hypertension    I take Lisinopril 10mg (1 daily)    Hypercholesterolemia    Prediabetes      Past Surgical History:   Procedure Laterality Date    Colonoscopy  2014    Electrocardiogram, complete  10/10/2013    Scanned to Media Tab    Hemorrhoidectomy        Family Hx:   Family History   Problem Relation Age of Onset    Diabetes Maternal Uncle     Stroke Mother         CVA    Psychiatric Brother     Psychiatric Brother       Social History:   Social History     Socioeconomic History    Marital status:    Tobacco Use    Smoking status: Former     Current packs/day: 0.00     Types: Cigarettes     Start date: 2016     Quit date: 2016     Years since quittin.7     Passive exposure: Past    Smokeless tobacco: Never   Vaping Use    Vaping status: Never Used   Substance and Sexual Activity    Alcohol use: No    Drug use: No   Other Topics Concern    Caffeine Concern No    Stress Concern No    Weight Concern No    Special Diet No    Exercise No    Seat Belt No        Medications (Active prior to today's visit):  Current Outpatient Medications   Medication Sig Dispense Refill    pantoprazole 20 MG Oral Tab EC Take 1 tablet (20 mg total) by mouth every morning before breakfast. 90 tablet 3    atorvastatin 20 MG Oral Tab Take 1 tablet (20 mg total) by mouth every evening. 90 tablet 2    lisinopril 10 MG Oral Tab Take 1 tablet (10 mg total) by mouth daily. No refills until seen in office for physical  and labs 90 tablet 2    celecoxib 200 MG Oral Cap Take 1 capsule (200 mg total) by mouth daily as needed for Pain. Take with food. This is for  pain and inflammation. 90 capsule 1    Na Sulfate-K Sulfate-Mg Sulf (SUPREP BOWEL PREP KIT) 17.5-3.13-1.6 GM/177ML Oral Solution Take as directed by GI clinic. Okay to substitute for generic. 1 each 0       Allergies:  No Known Allergies    ROS:   CONSTITUTIONAL: negative for fevers, chills, sweats  EYES Negative for scleral icterus or redness, and diplopia  HEENT: Negative for hoarseness  RESPIRATORY: Negative for cough and severe shortness of breath  CARDIOVASCULAR: Negative for crushing sub-sternal chest pain  GASTROINTESTINAL: See HPI  GENITOURINARY: Negative for dysuria  MUSCULOSKELETAL: Negative for arthralgias and myalgias  SKIN: Negative for jaundice, rash or pruritus  NEUROLOGICAL: Negative for dizziness and headaches  BEHAVIOR/PSYCH: Negative for psychotic behavior      PHYSICAL EXAM:   Blood pressure 144/89, pulse 90, height 5' 7\" (1.702 m), weight 189 lb (85.7 kg).    GEN: Alert, no acute distress, well-nourished   HEENT: anicteric sclera, neck supple, trachea midline, MMM, no palpable or tender neck or supraclavicular lymph nodes  CV: RRR, the extremities are warm and well perfused   LUNGS: No increased work of breathing, CTAB  ABDOMEN: Soft, symmetrical, non-tender without distention or guarding. No scars or lesions. Aorta is without bruit or visible pulsation. Umbilicus is midline without herniation. Normoactive bowel sounds are present, No masses, hepatomegaly or splenomegaly noted.  MSK: No erythema, no warmth, no swelling of joints  SKIN: No jaundice, no erythema, no rashes, no lesions  HEMATOLOGIC: No bleeding, no bruising  NEURO: Alert and interactive, LAUREANO  PSYCH: appropriate mood & affect    Labs/Imaging:     Patient's labs and imaging were reviewed and discussed with patient today.    .  ASSESSMENT/PLAN:   Jose Hung is a 59 year old year-old male with active diagnoses including hypertension, hyperlipidemia, GERD, back pain. Prior medical/surgical hx in note table. The patient  presents for follow up.    Today:  #hemorrhoids  -pt reports prolapsing hemorrhoid still protrudes almost every bowel movement. Denies pain or bleeding. Baseline has bowel movement 2x daily of soft stool. He is taking fiber supplement   -colonoscopy in January \"Medium sized internal hemorrhoids with one protuberant bundle palpable on rectal exam and appreciated on exam with the colonoscope, likely prolapsing per patient's description of significant hemorrhoid symptoms \". Photo in media  -colorectal surg referral provided, stay on fiber supplement    #GERD  #hiatal hernia  -since prior visit GERD symptoms have been improving with taking pantoprazole daily or every other day. Early satiety is intermittent. Dysphagia only with pills, able to clear with water or food.    -EGD in January finding of 3-4cm hiatal hernia, mild chronic inactive gastritis  -decreased pantoprazole dose to 20mg. Discussed diet and lifestyle to avoid GERD      Recommend:  -make an appointment with colo-rectal surgeon, Dr. Santacruz to discuss option for prolapsing hemorrhoid  -Dr. Santacruz: [facility]  #[phone]    -keep taking fiber supplement daily     -colonoscopy repeat in 10 years, due at age 69    -for acid reflux  Avoid triggers such as spicy food  Avoid eating 2-3 hours before laying down  Takes pantoprazole 20mg daily     -follow up once a year      Orders This Visit:  No orders of the defined types were placed in this encounter.      Meds This Visit:  Requested Prescriptions     Signed Prescriptions Disp Refills    pantoprazole 20 MG Oral Tab EC 90 tablet 3     Sig: Take 1 tablet (20 mg total) by mouth every morning before breakfast.       Imaging & Referrals:  SURGERY - INTERNAL       CORTES Dodson    [facility]  2/27/2025      The dictation was partially prepared using Dragon Medical voice recognition software. As a result, errors may occur. When identified, these errors have been corrected. While every  attempt is made to correct errors during dictation, discrepancies may still exist.

## 2025-02-27 NOTE — TELEPHONE ENCOUNTER
Please call patient to make an appointment, 30 day refill sent for Pantoprazole ,   Thank you

## 2025-02-27 NOTE — TELEPHONE ENCOUNTER
Refill passed per [facility] protocol.    30 day refill given on 02/27/25, appointment needed for further refills.      Requested Prescriptions   Pending Prescriptions Disp Refills    PANTOPRAZOLE 40 MG Oral Tab EC [Pharmacy Med Name: PANTOPRAZOLE SOD DR 40 MG TAB] 90 tablet 3     Sig: TAKE 1 TABLET BY MOUTH EVERY DAY BEFORE EATING DINNER       Gastrointestional Medication Protocol Passed - 2/27/2025  7:30 AM        Passed - In person appointment or virtual visit in the past 12 mos or appointment in next 3 mos     Recent Outpatient Visits              1 month ago [facility] [place]    Office Visit    5 months [facility]    [facility] Visit    7 months ago Annual physical exam    [facility], Simone Danielle MD    Office Visit    1 year ago Lethargy    [facility], Frank Duarte DO    Office Visit    2 years ago     [facility] Mehnaz Malcolm, PT    Office Visit          Future Appointments         Provider Department Appt Notes    Today Fany Alcantar APRN [facility] follow up    In 4 months [facility] CT [address] Just to have the CT Chest done so my doctor can see the results                    Passed - Medication is active on med list           Future Appointments         Provider Department Appt Notes    Today Fany Alcantar APRN [facility] follow up    In 4 months [facility] CT [address] Just to have the CT Chest done so my doctor can see the results          Recent Outpatient Visits              1 month ago Annual physical exam    [facility],  Simone Danielle MD    Office Visit    5 months ago Colon cancer screening    [facility] [place]    Office Visit    7 months ago Annual physical exam    [facility], [facility], Simone Danielle MD    Office Visit    1 year ago Lethargy    [facility], [address], Frank Duarte DO    Office Visit    2 years ago     [facility] Mehnaz Malcolm, PT    Office Visit

## 2025-03-04 NOTE — TELEPHONE ENCOUNTER
10  year colonoscopy recall entered. Health maintenance updated.    Colonoscopy done on 1/23/25 and next due on 1/23/35.

## 2025-03-04 NOTE — TELEPHONE ENCOUNTER
----- Message from Cornell Tierney sent at 3/1/2025 12:27 PM CST -----  GI RNs - 1. Please recall for colonoscopy exam in 10 years

## 2025-06-25 NOTE — TELEPHONE ENCOUNTER
Additional information faxed to [employer]. At [phone]. [facility] of letter patient sent via [employer] message. [facility] [employer] message sent to patient

## (undated) DIAGNOSIS — E55.9 VITAMIN D DEFICIENCY: ICD-10-CM

## (undated) NOTE — MR AVS SNAPSHOT
[address], [address]  [address] [facility]  [phone]               Thank you for choosing us for your health care visit with Inna Youssef DO.   We are glad to serve you and happy to provide you with this summary [facility] [place] [address]     Phone:  [phone]    - Fluticasone Propionate 50 MCG/ACT Susp  - Montelukast Sodium 10 MG Tabs  - Pantoprazole Sodium 40 MG Tbec            Today's Orders     CBC W Differential W Platelet [E]    Complete by:  Jan 13, 2017 (Approxi schedule your appointment. If you are confident that your benefit plan will not require a referral or authorization, such as [place] Medicaid, please feel free to schedule your appointment immediately.  However, if you are unsure about the requirements Aerobic physical activity Regular aerobic physical activity (e.g., brisk walking, light jogging, cycling, swimming, etc.) for a goal of at least 150 minutes per week. Moderation of alcohol consumption Men: limit to <= 2 drinks* per day.   Women and lighte

## (undated) NOTE — LETTER
8/7/2020              [address] [place] [id number]         To whom it may concern,    Milton Santacruz is currently a patient under my medical care.   When you are at the hospital working, see if you can go to occupational